# Patient Record
Sex: FEMALE | Race: WHITE | NOT HISPANIC OR LATINO | Employment: PART TIME | URBAN - NONMETROPOLITAN AREA
[De-identification: names, ages, dates, MRNs, and addresses within clinical notes are randomized per-mention and may not be internally consistent; named-entity substitution may affect disease eponyms.]

---

## 2017-02-20 ENCOUNTER — OFFICE VISIT (OUTPATIENT)
Dept: CARDIOLOGY | Facility: CLINIC | Age: 67
End: 2017-02-20

## 2017-02-20 VITALS
DIASTOLIC BLOOD PRESSURE: 70 MMHG | BODY MASS INDEX: 24.04 KG/M2 | HEART RATE: 52 BPM | WEIGHT: 149.6 LBS | SYSTOLIC BLOOD PRESSURE: 124 MMHG | HEIGHT: 66 IN | OXYGEN SATURATION: 98 %

## 2017-02-20 DIAGNOSIS — E78.5 HYPERLIPIDEMIA, UNSPECIFIED HYPERLIPIDEMIA TYPE: ICD-10-CM

## 2017-02-20 DIAGNOSIS — I47.1 ATRIAL TACHYCARDIA (HCC): Primary | ICD-10-CM

## 2017-02-20 PROCEDURE — 93000 ELECTROCARDIOGRAM COMPLETE: CPT | Performed by: INTERNAL MEDICINE

## 2017-02-20 PROCEDURE — 99213 OFFICE O/P EST LOW 20 MIN: CPT | Performed by: INTERNAL MEDICINE

## 2017-02-20 RX ORDER — DILTIAZEM HYDROCHLORIDE 120 MG/1
120 CAPSULE, COATED, EXTENDED RELEASE ORAL DAILY
Qty: 30 CAPSULE | Refills: 11 | Status: SHIPPED | OUTPATIENT
Start: 2017-02-20 | End: 2018-03-01 | Stop reason: SDUPTHER

## 2017-02-20 RX ORDER — FLECAINIDE ACETATE 100 MG/1
100 TABLET ORAL 2 TIMES DAILY
Qty: 60 TABLET | Refills: 11 | Status: SHIPPED | OUTPATIENT
Start: 2017-02-20 | End: 2018-04-01 | Stop reason: SDUPTHER

## 2017-02-20 NOTE — PROGRESS NOTES
Reason for Visit: cardiovascular follow up.    HPI:  Reshma Crouch is a 66 y.o. female is here today for follow-up.  She previously had an episode of atrial tachycardia in March 2016.  She was started on diltiazem and flecainide.  She has been followed by Dr. Moe as well who sees her once a year.  She has not had any further incidence of atrial tachycardia or significant palpitations since being on this medical therapy.  She denies any chest pain, dizziness, syncope, PND, orthopnea.    Previous Cardiac Testing and Procedures:  - Echo (03/30/2016) EF 60-65%, normal RV size and function, normal valves    Patient Active Problem List   Diagnosis   • ANTONIO (iron deficiency anemia)   • Paroxysmal SVT (supraventricular tachycardia)   • AV block   • Hyperlipidemia   • Atrial tachycardia       Social History   Substance Use Topics   • Smoking status: Never Smoker   • Smokeless tobacco: None   • Alcohol use No       Family History   Problem Relation Age of Onset   • Cirrhosis Mother      NON ALCOHOLIC CIRRHOSIS   • Other Father      NEPHRITIS/KIDNEY TRANSPLANT       The following portions of the patient's history were reviewed and updated as appropriate: allergies, current medications, past family history, past medical history, past social history, past surgical history and problem list.      Current Outpatient Prescriptions:   •  atorvastatin (LIPITOR) 20 MG tablet, Take 20 mg by mouth Every Night., Disp: , Rfl:   •  diltiaZEM CD (CARDIZEM CD) 120 MG 24 hr capsule, Take 120 mg by mouth Daily., Disp: , Rfl:   •  flecainide (TAMBOCOR) 100 MG tablet, Take 100 mg by mouth 2 (Two) Times a Day., Disp: , Rfl:     Review of Systems   Constitution: Negative for chills and fever.   Cardiovascular: Negative for chest pain and paroxysmal nocturnal dyspnea.   Respiratory: Negative for cough and shortness of breath.    Skin: Negative for rash.   Gastrointestinal: Negative for abdominal pain and heartburn.   Neurological: Negative for  "dizziness and numbness.       Objective   Visit Vitals   • /70 (BP Location: Left arm, Patient Position: Sitting, Cuff Size: Adult)   • Pulse 52   • Ht 66\" (167.6 cm)   • Wt 149 lb 9.6 oz (67.9 kg)   • SpO2 98%   • BMI 24.15 kg/m2     Physical Exam   Constitutional: She is oriented to person, place, and time. She appears well-developed and well-nourished.   HENT:   Head: Normocephalic and atraumatic.   Cardiovascular: Normal rate, regular rhythm and normal heart sounds.    No murmur heard.  Pulmonary/Chest: Effort normal and breath sounds normal.   Musculoskeletal: She exhibits no edema.   Neurological: She is alert and oriented to person, place, and time.   Skin: Skin is warm and dry.   Psychiatric: She has a normal mood and affect.       ECG 12 Lead  Date/Time: 2/20/2017 9:14 AM  Performed by: THAIS ROA  Authorized by: THAIS ROA   Comparison: compared with previous ECG from 8/15/2016  Similar to previous ECG  Rhythm: sinus rhythm  Rate: bradycardic  Clinical impression: normal ECG              ICD-10-CM ICD-9-CM   1. Atrial tachycardia I47.1 427.89   2. Hyperlipidemia, unspecified hyperlipidemia type E78.5 272.4         Assessment/Plan:  1.  Atrial tachycardia: Managed on diltiazem and flecainide.  No evidence of recurrence.  Follows with Dr. Moe once a year.    2.  Dyslipidemia: Managed on atorvastatin.  Plans to have her lipid panel rechecked by Dr. Coreas next month.    Follow-up 6 months, if remains free of any significant arrhythmias will go to one year follow-up thereafter.  "

## 2017-03-13 ENCOUNTER — TRANSCRIBE ORDERS (OUTPATIENT)
Dept: ADMINISTRATIVE | Facility: HOSPITAL | Age: 67
End: 2017-03-13

## 2017-03-13 DIAGNOSIS — Z12.31 ENCOUNTER FOR MAMMOGRAM TO ESTABLISH BASELINE MAMMOGRAM: Primary | ICD-10-CM

## 2017-03-20 ENCOUNTER — HOSPITAL ENCOUNTER (OUTPATIENT)
Dept: MAMMOGRAPHY | Facility: HOSPITAL | Age: 67
Discharge: HOME | End: 2017-03-20
Attending: INTERNAL MEDICINE | Admitting: INTERNAL MEDICINE

## 2017-03-20 DIAGNOSIS — Z12.31 ENCOUNTER FOR MAMMOGRAM TO ESTABLISH BASELINE MAMMOGRAM: ICD-10-CM

## 2017-03-20 PROCEDURE — 77063 BREAST TOMOSYNTHESIS BI: CPT

## 2017-03-20 PROCEDURE — G0202 SCR MAMMO BI INCL CAD: HCPCS

## 2017-05-01 ENCOUNTER — TRANSCRIBE ORDERS (OUTPATIENT)
Dept: ADMINISTRATIVE | Facility: HOSPITAL | Age: 67
End: 2017-05-01

## 2017-05-01 DIAGNOSIS — R16.1 SPLENOMEGALY: Primary | ICD-10-CM

## 2017-05-04 ENCOUNTER — TRANSCRIBE ORDERS (OUTPATIENT)
Dept: ADMINISTRATIVE | Facility: HOSPITAL | Age: 67
End: 2017-05-04

## 2017-05-04 DIAGNOSIS — R63.4 WEIGHT LOSS: ICD-10-CM

## 2017-05-04 DIAGNOSIS — R16.1 SPLENOMEGALY: Primary | ICD-10-CM

## 2017-05-08 ENCOUNTER — HOSPITAL ENCOUNTER (OUTPATIENT)
Dept: CT IMAGING | Facility: HOSPITAL | Age: 67
Discharge: HOME | End: 2017-05-08
Attending: INTERNAL MEDICINE | Admitting: INTERNAL MEDICINE

## 2017-05-08 ENCOUNTER — LAB (OUTPATIENT)
Dept: LAB | Facility: HOSPITAL | Age: 67
End: 2017-05-08
Attending: INTERNAL MEDICINE

## 2017-05-08 ENCOUNTER — TRANSCRIBE ORDERS (OUTPATIENT)
Dept: ADMINISTRATIVE | Facility: HOSPITAL | Age: 67
End: 2017-05-08

## 2017-05-08 ENCOUNTER — APPOINTMENT (OUTPATIENT)
Dept: ULTRASOUND IMAGING | Facility: HOSPITAL | Age: 67
End: 2017-05-08
Attending: INTERNAL MEDICINE

## 2017-05-08 DIAGNOSIS — R16.1 SPLENOMEGALY: ICD-10-CM

## 2017-05-08 DIAGNOSIS — R63.4 WEIGHT LOSS: ICD-10-CM

## 2017-05-08 DIAGNOSIS — N18.9 CKD (CHRONIC KIDNEY DISEASE), UNSPECIFIED STAGE: ICD-10-CM

## 2017-05-08 DIAGNOSIS — D64.9 ANEMIA, UNSPECIFIED TYPE: ICD-10-CM

## 2017-05-08 DIAGNOSIS — N18.9 CKD (CHRONIC KIDNEY DISEASE), UNSPECIFIED STAGE: Primary | ICD-10-CM

## 2017-05-08 LAB — CREAT BLDA-MCNC: 1.1 MG/DL (ref 0.6–1.3)

## 2017-05-08 PROCEDURE — 81050 URINALYSIS VOLUME MEASURE: CPT | Performed by: INTERNAL MEDICINE

## 2017-05-08 PROCEDURE — 74177 CT ABD & PELVIS W/CONTRAST: CPT

## 2017-05-08 PROCEDURE — 84156 ASSAY OF PROTEIN URINE: CPT | Performed by: INTERNAL MEDICINE

## 2017-05-08 PROCEDURE — 86335 IMMUNFIX E-PHORSIS/URINE/CSF: CPT | Performed by: INTERNAL MEDICINE

## 2017-05-08 PROCEDURE — 0 IOPAMIDOL 61 % SOLUTION: Performed by: INTERNAL MEDICINE

## 2017-05-08 PROCEDURE — 84166 PROTEIN E-PHORESIS/URINE/CSF: CPT | Performed by: INTERNAL MEDICINE

## 2017-05-08 PROCEDURE — 82565 ASSAY OF CREATININE: CPT

## 2017-05-08 RX ADMIN — IOPAMIDOL 100 ML: 612 INJECTION, SOLUTION INTRAVENOUS at 09:45

## 2017-05-09 LAB
ALBUMIN 24H MFR UR ELPH: 34 %
ALPHA1 GLOB 24H MFR UR ELPH: 2.9 %
ALPHA2 GLOB 24H MFR UR ELPH: 13.3 %
B-GLOBULIN MFR UR ELPH: 28.2 %
GAMMA GLOB 24H MFR UR ELPH: 21.6 %
HIV 1 & 2 AB SER-IMP: ABNORMAL
INTERPRETATION UR IFE-IMP: ABNORMAL
M PROTEIN 24H MFR UR ELPH: ABNORMAL %
PROT 24H UR-MRATE: 275 MG/24 HR (ref 30–150)
PROT UR-MCNC: 16.2 MG/DL

## 2017-06-05 ENCOUNTER — OFFICE VISIT (OUTPATIENT)
Dept: UROLOGY | Facility: CLINIC | Age: 67
End: 2017-06-05

## 2017-06-05 VITALS
SYSTOLIC BLOOD PRESSURE: 122 MMHG | BODY MASS INDEX: 24.06 KG/M2 | DIASTOLIC BLOOD PRESSURE: 74 MMHG | HEIGHT: 65 IN | TEMPERATURE: 96.5 F | WEIGHT: 144.4 LBS

## 2017-06-05 DIAGNOSIS — N28.89 RENAL MASS: Primary | ICD-10-CM

## 2017-06-05 LAB
BILIRUB BLD-MCNC: NEGATIVE MG/DL
CLARITY, POC: CLEAR
COLOR UR: YELLOW
GLUCOSE UR STRIP-MCNC: NEGATIVE MG/DL
KETONES UR QL: NEGATIVE
LEUKOCYTE EST, POC: NEGATIVE
NITRITE UR-MCNC: NEGATIVE MG/ML
PH UR: 5.5 [PH] (ref 5–8)
PROT UR STRIP-MCNC: NEGATIVE MG/DL
RBC # UR STRIP: ABNORMAL /UL
SP GR UR: 1.02 (ref 1–1.03)
UROBILINOGEN UR QL: NORMAL

## 2017-06-05 PROCEDURE — 81003 URINALYSIS AUTO W/O SCOPE: CPT | Performed by: UROLOGY

## 2017-06-05 PROCEDURE — 99204 OFFICE O/P NEW MOD 45 MIN: CPT | Performed by: UROLOGY

## 2017-06-05 NOTE — PROGRESS NOTES
Subjective    Ms. Crouch is 67 y.o. female    Chief Complaint: Renal Mass    History of Present Illness     Renal Mass  Patient here for evaluation of renal mass.  The renal mass was found last week.  The location of the mass is the left kidney.  The mass has been present for1 week.  The mass is described as solid.  The size of the mass is 13cm.  It would be classifed as a Bosniak IV.  The mass was found on evaluation of splenomegaly.  Associated symptoms include microhematuria.      The following portions of the patient's history were reviewed and updated as appropriate: allergies, current medications, past family history, past medical history, past social history, past surgical history and problem list.    Review of Systems   Constitutional: Negative for appetite change, diaphoresis and fever.   HENT: Negative for facial swelling and sore throat.    Eyes: Negative for discharge and visual disturbance.   Respiratory: Negative for cough and shortness of breath.    Cardiovascular: Negative for chest pain and leg swelling.   Gastrointestinal: Negative for anal bleeding and vomiting.   Endocrine: Negative for cold intolerance and heat intolerance.   Genitourinary: Negative for flank pain, hematuria and pelvic pain.   Musculoskeletal: Negative for back pain and gait problem.   Skin: Negative for pallor and rash.   Allergic/Immunologic: Negative for food allergies and immunocompromised state.   Neurological: Negative for seizures and headaches.   Hematological: Negative for adenopathy. Does not bruise/bleed easily.   Psychiatric/Behavioral: Negative for dysphoric mood, self-injury and suicidal ideas.         Current Outpatient Prescriptions:   •  atorvastatin (LIPITOR) 20 MG tablet, Take 20 mg by mouth Every Night., Disp: , Rfl:   •  diltiaZEM CD (CARDIZEM CD) 120 MG 24 hr capsule, Take 1 capsule by mouth Daily., Disp: 30 capsule, Rfl: 11  •  flecainide (TAMBOCOR) 100 MG tablet, Take 1 tablet by mouth 2 (Two) Times a Day.,  "Disp: 60 tablet, Rfl: 11    Past Medical History:   Diagnosis Date   • Atrial tachycardia    • AV block    • Hospital discharge follow-up    • Hyperlipidemia    • ANTONIO (iron deficiency anemia)    • Paroxysmal SVT (supraventricular tachycardia)        Past Surgical History:   Procedure Laterality Date   •  SECTION     • CHOLECYSTECTOMY     • TONSILLECTOMY         Social History     Social History   • Marital status: Single     Spouse name: N/A   • Number of children: N/A   • Years of education: N/A     Social History Main Topics   • Smoking status: Never Smoker   • Smokeless tobacco: None   • Alcohol use No   • Drug use: Defer   • Sexual activity: Defer     Other Topics Concern   • None     Social History Narrative       Family History   Problem Relation Age of Onset   • Cirrhosis Mother      NON ALCOHOLIC CIRRHOSIS   • Other Father      NEPHRITIS/KIDNEY TRANSPLANT   • Breast cancer Neg Hx        Objective    /74  Temp 96.5 °F (35.8 °C)  Ht 65\" (165.1 cm)  Wt 144 lb 6.4 oz (65.5 kg)  BMI 24.03 kg/m2    Physical Exam   Constitutional: She is oriented to person, place, and time. She appears well-developed and well-nourished. No distress.   HENT:   Head: Normocephalic and atraumatic.   Right Ear: External ear and ear canal normal.   Left Ear: External ear and ear canal normal.   Nose: No nasal deformity. No epistaxis.   Mouth/Throat: Oropharynx is clear and moist. Mucous membranes are not pale, not dry and not cyanotic. Normal dentition. No oropharyngeal exudate.   Neck: Trachea normal. No tracheal tenderness present. No tracheal deviation present. No thyroid mass and no thyromegaly present.   Pulmonary/Chest: Effort normal. No accessory muscle usage. No respiratory distress. Chest wall is not dull to percussion (No flatness or hyperresonance). She exhibits no mass and no tenderness.   On palpation, no tactile fremitus. All movements are symmetric. No intercostal retraction noted.    Abdominal: Soft. " Normal appearance. She exhibits no distension and no mass. There is no hepatosplenomegaly. There is no tenderness. No hernia.   Rectal examination or stool specimen is not indicated.    Musculoskeletal:   Normal gait and station. The spine, ribs, and pelvis are examined. No obvious misalignment or asymmetry. ROM is reasonable for age. No instability. No obvious atrophy, flaccidity or spasticity.    Lymphadenopathy:     She has no cervical adenopathy.        Right: No inguinal adenopathy present.        Left: No inguinal adenopathy present.   Neurological: She is alert and oriented to person, place, and time.   Skin: Skin is warm, dry and intact. No lesion and no rash noted. She is not diaphoretic. No cyanosis. No pallor. Nails show no clubbing.   On palpation, there were no induration, subcutaneous nodules, or tightening   Psychiatric: Her speech is normal and behavior is normal. Judgment and thought content normal. Her mood appears not anxious. Her affect is not labile. She does not exhibit a depressed mood.   Vitals reviewed.          Results for orders placed or performed in visit on 06/05/17   POC Urinalysis Dipstick, Automated   Result Value Ref Range    Color Yellow Yellow, Straw, Dark Yellow, Tsering    Clarity, UA Clear Clear    Glucose, UA Negative Negative, 1000 mg/dL (3+) mg/dL    Bilirubin Negative Negative    Ketones, UA Negative Negative    Specific Gravity  1.020 1.005 - 1.030    Blood, UA Large (A) Negative    pH, Urine 5.5 5.0 - 8.0    Protein, POC Negative Negative mg/dL    Urobilinogen, UA Normal Normal    Leukocytes Negative Negative    Nitrite, UA Negative Negative   CT independent review  The CT scan of the abdomen/pelvis done without contrast is available for me to review.  Treatment recommendations require an independent review.  First I scanned the liver, spleen, and bowel pattern.  The retroperitoneum including the major vessels and lymphatic packages are briefly reviewed.  This film as been  reviewed by the radiologist to determine any non urologic abnormalities that are present.  The kidneys are closely inspected for size, symmetry, contour, parenchymal thickness, perinephric reaction, presence of calcifications, and intrarenal dilation of the collecting system.  The ureters are inspected for their course, caliber, and any calcifications.  The bladder is inspected for its thickness, size, and presence of any calcifications.  This scan shows:    The right kidney appears simple renal cyst    The left kidney appears 13 cm upper pole renal mass almost completely replacing the left kidney.  There is a plane between the spleen and the mass as well as the pancreas and the mass.  There is some hilar adenopathy and some parasitic vessels.    The bladder appears normal on this non-contrasted CT scan.  The bladder appears normal in thickness.  There no masses or stones seen on this exam.      Assessment and Plan    Reshma was seen today for renal mass.    Diagnoses and all orders for this visit:    Renal mass  -     POC Urinalysis Dipstick, Automated      She has a very large renal mass.  This is obviously concerning for renal cell carcinoma.  She has a CMP shows a normal alkaline phosphatase.  She is not having any bone or neurologic symptoms.  I am going to check a chest x-ray today.  I have recommended referral to [facility] for this large mass.  I think she would best be served by being seen at a tertiary care center.  She also has a mass or anorectal junction which she seen the gastroenterologist for on the 19th of this month.

## 2017-06-19 ENCOUNTER — OFFICE VISIT (OUTPATIENT)
Dept: GASTROENTEROLOGY | Facility: CLINIC | Age: 67
End: 2017-06-19

## 2017-06-19 VITALS
HEART RATE: 64 BPM | WEIGHT: 141 LBS | HEIGHT: 65 IN | DIASTOLIC BLOOD PRESSURE: 76 MMHG | TEMPERATURE: 97.9 F | BODY MASS INDEX: 23.49 KG/M2 | SYSTOLIC BLOOD PRESSURE: 124 MMHG

## 2017-06-19 DIAGNOSIS — D50.9 IRON DEFICIENCY ANEMIA, UNSPECIFIED IRON DEFICIENCY ANEMIA TYPE: ICD-10-CM

## 2017-06-19 DIAGNOSIS — R93.5 ABNORMAL CT OF THE ABDOMEN: Primary | ICD-10-CM

## 2017-06-19 PROCEDURE — 99204 OFFICE O/P NEW MOD 45 MIN: CPT | Performed by: NURSE PRACTITIONER

## 2017-06-19 RX ORDER — FERROUS SULFATE 325(65) MG
TABLET ORAL
COMMUNITY
Start: 2017-06-12 | End: 2018-04-23

## 2017-06-19 NOTE — PROGRESS NOTES
Chief Complaint   Patient presents with   • Anemia     is anemic had ct showed rectal mass       Subjective     HPI  Pt referred to Dr Samuels for further evaluation of anemia.  CT of abdomen on 17 for evaluation of splenomegaly revealed mass to left kidney and a questionable mass at the anal verge.  Small lymph nodes in the left renal hilum could represent metastatic disease.  She has been evaluated by Dr Pack and currently has apt at [facility] on July 3 for further evaluation of kidney mass.  She denies weight loss, change in bowels, no BRBPR.  Stools were not dark until she started taking an iron supplement to treat iron deficiency.  She has never had colonoscopy evaluation.    Lab Results   Component Value Date    IRON 33 (L) 2016         Past Medical History:   Diagnosis Date   • Atrial tachycardia    • AV block    • Hospital discharge follow-up    • Hyperlipidemia    • ANTONIO (iron deficiency anemia)    • Paroxysmal SVT (supraventricular tachycardia)        Past Surgical History:   Procedure Laterality Date   •  SECTION     • CHOLECYSTECTOMY     • TONSILLECTOMY         Outpatient Prescriptions Marked as Taking for the 17 encounter (Office Visit) with JUSTIN Emmanuel   Medication Sig Dispense Refill   • atorvastatin (LIPITOR) 20 MG tablet Take 20 mg by mouth Every Night.     • diltiaZEM CD (CARDIZEM CD) 120 MG 24 hr capsule Take 1 capsule by mouth Daily. 30 capsule 11   • ferrous sulfate 325 (65 FE) MG tablet      • flecainide (TAMBOCOR) 100 MG tablet Take 1 tablet by mouth 2 (Two) Times a Day. 60 tablet 11   • folic acid-vit B6-vit B12 (FOLBEE) 2.5-25-1 MG tablet tablet Take  by mouth Daily.         Allergies   Allergen Reactions   • Novocain [Procaine]      LOCAL ANESTHETICS-Parenteral       Social History     Social History   • Marital status: Single     Spouse name: N/A   • Number of children: N/A   • Years of education: N/A     Occupational History   • Not on file.     Social  "History Main Topics   • Smoking status: Never Smoker   • Smokeless tobacco: Never Used   • Alcohol use No   • Drug use: No   • Sexual activity: Defer     Other Topics Concern   • Not on file     Social History Narrative       Family History   Problem Relation Age of Onset   • Cirrhosis Mother      NON ALCOHOLIC CIRRHOSIS   • Other Father      NEPHRITIS/KIDNEY TRANSPLANT   • Breast cancer Neg Hx    • Colon cancer Neg Hx    • Esophageal cancer Neg Hx        Review of Systems   Constitutional: Negative for fatigue, fever and unexpected weight change.   HENT: Negative for hearing loss, sore throat and voice change.    Eyes: Negative for visual disturbance.   Respiratory: Negative for cough, shortness of breath and wheezing.    Cardiovascular: Negative for chest pain and palpitations.   Gastrointestinal: Negative for abdominal pain, blood in stool and vomiting.   Endocrine: Negative for polydipsia and polyuria.   Genitourinary: Negative for difficulty urinating, dysuria, hematuria and urgency.   Musculoskeletal: Negative for joint swelling and myalgias.   Skin: Negative for color change, rash and wound.   Neurological: Negative for dizziness, tremors, seizures and syncope.   Hematological: Does not bruise/bleed easily.   Psychiatric/Behavioral: Negative for agitation and confusion. The patient is not nervous/anxious.        Objective     Vitals:    06/19/17 0902   BP: 124/76   Pulse: 64   Temp: 97.9 °F (36.6 °C)   Weight: 141 lb (64 kg)   Height: 65\" (165.1 cm)     Body mass index is 23.46 kg/(m^2).    Physical Exam   Constitutional: She is oriented to person, place, and time. She appears well-developed and well-nourished.   HENT:   Head: Normocephalic and atraumatic.   Eyes: Conjunctivae are normal. Pupils are equal, round, and reactive to light. No scleral icterus.   Neck: No JVD present. No thyroid mass and no thyromegaly present.   Cardiovascular: Normal rate, regular rhythm and normal heart sounds.  Exam reveals no " gallop and no friction rub.    No murmur heard.  Pulmonary/Chest: Effort normal and breath sounds normal. No accessory muscle usage. No respiratory distress. She has no wheezes. She has no rales.   Abdominal: Soft. Bowel sounds are normal. She exhibits no distension, no ascites and no mass. There is no splenomegaly or hepatomegaly. There is no tenderness. There is no rebound and no guarding.   Genitourinary:   Genitourinary Comments: Rectal-Did not examine   Musculoskeletal: Normal range of motion. She exhibits no edema.   Neurological: She is alert and oriented to person, place, and time.   Deemed a reliable historian, able to converse without difficulty and able to move all extremities without difficulty   Skin: Skin is warm and dry.   Psychiatric: She has a normal mood and affect. Her behavior is normal.       Imaging Results (most recent)     None          Assessment/Plan     Reshma was seen today for anemia.    Diagnoses and all orders for this visit:    Abnormal CT of the abdomen  -     polyethylene glycol (GoLYTELY) 236 G solution; Take 3,785 mL by mouth 1 (One) Time for 1 dose. Take as directed  -     Case Request; Standing  -     Implement Anesthesia Orders Day of Procedure; Standing  -     Obtain Informed Consent; Standing  -     Case Request    Iron deficiency anemia, unspecified iron deficiency anemia type      COLONOSCOPY WITH ANESTHESIA (N/A)    There are no Patient Instructions on file for this visit.    Strongly encouraged pt to proceed with CScope this week so pathology reports would be available for apt at [place] in the event an abnormality was observed during procedure    All risks, benefits, alternatives, and indications of colonoscopy procedure have been discussed with the patient. Risks to include perforation of the colon requiring possible surgery or colostomy, risk of bleeding from biopsies or removal of colon tissue, possibility of missing a colon polyp or cancer, or adverse drug  reaction.  Benefits to include the diagnosis and management of disease of the colon and rectum. Alternatives to include barium enema, radiographic evaluation, lab testing or no intervention. Pt verbalizes understanding and agrees to proceed with procedure.

## 2017-06-21 ENCOUNTER — ANESTHESIA EVENT (OUTPATIENT)
Dept: GASTROENTEROLOGY | Facility: HOSPITAL | Age: 67
End: 2017-06-21

## 2017-06-22 ENCOUNTER — HOSPITAL ENCOUNTER (OUTPATIENT)
Facility: HOSPITAL | Age: 67
Setting detail: HOSPITAL OUTPATIENT SURGERY
Discharge: HOME | End: 2017-06-22
Attending: INTERNAL MEDICINE | Admitting: INTERNAL MEDICINE

## 2017-06-22 ENCOUNTER — TELEPHONE (OUTPATIENT)
Dept: GASTROENTEROLOGY | Facility: CLINIC | Age: 67
End: 2017-06-22

## 2017-06-22 ENCOUNTER — ANESTHESIA (OUTPATIENT)
Dept: GASTROENTEROLOGY | Facility: HOSPITAL | Age: 67
End: 2017-06-22

## 2017-06-22 VITALS
BODY MASS INDEX: 22.5 KG/M2 | WEIGHT: 140 LBS | HEIGHT: 66 IN | OXYGEN SATURATION: 98 % | HEART RATE: 55 BPM | TEMPERATURE: 97.7 F | RESPIRATION RATE: 17 BRPM | DIASTOLIC BLOOD PRESSURE: 67 MMHG | SYSTOLIC BLOOD PRESSURE: 121 MMHG

## 2017-06-22 DIAGNOSIS — R93.5 ABNORMAL CT OF THE ABDOMEN: ICD-10-CM

## 2017-06-22 PROCEDURE — 45385 COLONOSCOPY W/LESION REMOVAL: CPT | Performed by: INTERNAL MEDICINE

## 2017-06-22 PROCEDURE — 25010000002 PROPOFOL 10 MG/ML EMULSION: Performed by: NURSE ANESTHETIST, CERTIFIED REGISTERED

## 2017-06-22 RX ORDER — PROPOFOL 10 MG/ML
VIAL (ML) INTRAVENOUS AS NEEDED
Status: DISCONTINUED | OUTPATIENT
Start: 2017-06-22 | End: 2017-06-22 | Stop reason: SURG

## 2017-06-22 RX ORDER — LIDOCAINE HYDROCHLORIDE 20 MG/ML
INJECTION, SOLUTION INFILTRATION; PERINEURAL AS NEEDED
Status: DISCONTINUED | OUTPATIENT
Start: 2017-06-22 | End: 2017-06-22 | Stop reason: SURG

## 2017-06-22 RX ORDER — SODIUM CHLORIDE 0.9 % (FLUSH) 0.9 %
1-10 SYRINGE (ML) INJECTION AS NEEDED
Status: DISCONTINUED | OUTPATIENT
Start: 2017-06-22 | End: 2017-06-22 | Stop reason: HOSPADM

## 2017-06-22 RX ORDER — SODIUM CHLORIDE 9 MG/ML
100 INJECTION, SOLUTION INTRAVENOUS CONTINUOUS
Status: DISCONTINUED | OUTPATIENT
Start: 2017-06-22 | End: 2017-06-22 | Stop reason: HOSPADM

## 2017-06-22 RX ADMIN — LIDOCAINE HYDROCHLORIDE 50 MG: 20 INJECTION, SOLUTION INFILTRATION; PERINEURAL at 11:47

## 2017-06-22 RX ADMIN — PROPOFOL 200 MG: 10 INJECTION, EMULSION INTRAVENOUS at 11:46

## 2017-06-22 RX ADMIN — SODIUM CHLORIDE 100 ML/HR: 9 INJECTION, SOLUTION INTRAVENOUS at 11:38

## 2017-06-22 NOTE — PLAN OF CARE
Problem: Patient Care Overview (Adult)  Goal: Plan of Care Review    06/22/17 1158   Outcome Evaluation   Outcome Summary/Follow up Plan tolerated procedure well   Patient Care Overview   Progress improving         Problem: GI Endoscopy (Adult)  Goal: Signs and Symptoms of Listed Potential Problems Will be Absent or Manageable (GI Endoscopy)  Outcome: Ongoing (interventions implemented as appropriate)    06/22/17 1158   GI Endoscopy   Problems Assessed (GI Endoscopy) all   Problems Present (GI Endoscopy) none

## 2017-06-22 NOTE — PLAN OF CARE
Problem: Patient Care Overview (Adult)  Goal: Adult Individualization and Mutuality  Outcome: Ongoing (interventions implemented as appropriate)    06/22/17 1124   Individualization   Patient Specific Preferences none

## 2017-06-22 NOTE — H&P (VIEW-ONLY)
Chief Complaint   Patient presents with   • Anemia     is anemic had ct showed rectal mass       Subjective     HPI  Pt referred to Dr Samuels for further evaluation of anemia.  CT of abdomen on 17 for evaluation of splenomegaly revealed mass to left kidney and a questionable mass at the anal verge.  Small lymph nodes in the left renal hilum could represent metastatic disease.  She has been evaluated by Dr Pack and currently has apt at [place] on July 3 for further evaluation of kidney mass.  She denies weight loss, change in bowels, no BRBPR.  Stools were not dark until she started taking an iron supplement to treat iron deficiency.  She has never had colonoscopy evaluation.    Lab Results   Component Value Date    IRON 33 (L) 2016         Past Medical History:   Diagnosis Date   • Atrial tachycardia    • AV block    • Hospital discharge follow-up    • Hyperlipidemia    • ANTONIO (iron deficiency anemia)    • Paroxysmal SVT (supraventricular tachycardia)        Past Surgical History:   Procedure Laterality Date   •  SECTION     • CHOLECYSTECTOMY     • TONSILLECTOMY         Outpatient Prescriptions Marked as Taking for the 17 encounter (Office Visit) with JUSTIN Emmanuel   Medication Sig Dispense Refill   • atorvastatin (LIPITOR) 20 MG tablet Take 20 mg by mouth Every Night.     • diltiaZEM CD (CARDIZEM CD) 120 MG 24 hr capsule Take 1 capsule by mouth Daily. 30 capsule 11   • ferrous sulfate 325 (65 FE) MG tablet      • flecainide (TAMBOCOR) 100 MG tablet Take 1 tablet by mouth 2 (Two) Times a Day. 60 tablet 11   • folic acid-vit B6-vit B12 (FOLBEE) 2.5-25-1 MG tablet tablet Take  by mouth Daily.         Allergies   Allergen Reactions   • Novocain [Procaine]      LOCAL ANESTHETICS-Parenteral       Social History     Social History   • Marital status: Single     Spouse name: N/A   • Number of children: N/A   • Years of education: N/A     Occupational History   • Not on file.     Social  "History Main Topics   • Smoking status: Never Smoker   • Smokeless tobacco: Never Used   • Alcohol use No   • Drug use: No   • Sexual activity: Defer     Other Topics Concern   • Not on file     Social History Narrative       Family History   Problem Relation Age of Onset   • Cirrhosis Mother      NON ALCOHOLIC CIRRHOSIS   • Other Father      NEPHRITIS/KIDNEY TRANSPLANT   • Breast cancer Neg Hx    • Colon cancer Neg Hx    • Esophageal cancer Neg Hx        Review of Systems   Constitutional: Negative for fatigue, fever and unexpected weight change.   HENT: Negative for hearing loss, sore throat and voice change.    Eyes: Negative for visual disturbance.   Respiratory: Negative for cough, shortness of breath and wheezing.    Cardiovascular: Negative for chest pain and palpitations.   Gastrointestinal: Negative for abdominal pain, blood in stool and vomiting.   Endocrine: Negative for polydipsia and polyuria.   Genitourinary: Negative for difficulty urinating, dysuria, hematuria and urgency.   Musculoskeletal: Negative for joint swelling and myalgias.   Skin: Negative for color change, rash and wound.   Neurological: Negative for dizziness, tremors, seizures and syncope.   Hematological: Does not bruise/bleed easily.   Psychiatric/Behavioral: Negative for agitation and confusion. The patient is not nervous/anxious.        Objective     Vitals:    06/19/17 0902   BP: 124/76   Pulse: 64   Temp: 97.9 °F (36.6 °C)   Weight: 141 lb (64 kg)   Height: 65\" (165.1 cm)     Body mass index is 23.46 kg/(m^2).    Physical Exam   Constitutional: She is oriented to person, place, and time. She appears well-developed and well-nourished.   HENT:   Head: Normocephalic and atraumatic.   Eyes: Conjunctivae are normal. Pupils are equal, round, and reactive to light. No scleral icterus.   Neck: No JVD present. No thyroid mass and no thyromegaly present.   Cardiovascular: Normal rate, regular rhythm and normal heart sounds.  Exam reveals no " gallop and no friction rub.    No murmur heard.  Pulmonary/Chest: Effort normal and breath sounds normal. No accessory muscle usage. No respiratory distress. She has no wheezes. She has no rales.   Abdominal: Soft. Bowel sounds are normal. She exhibits no distension, no ascites and no mass. There is no splenomegaly or hepatomegaly. There is no tenderness. There is no rebound and no guarding.   Genitourinary:   Genitourinary Comments: Rectal-Did not examine   Musculoskeletal: Normal range of motion. She exhibits no edema.   Neurological: She is alert and oriented to person, place, and time.   Deemed a reliable historian, able to converse without difficulty and able to move all extremities without difficulty   Skin: Skin is warm and dry.   Psychiatric: She has a normal mood and affect. Her behavior is normal.       Imaging Results (most recent)     None          Assessment/Plan     Reshma was seen today for anemia.    Diagnoses and all orders for this visit:    Abnormal CT of the abdomen  -     polyethylene glycol (GoLYTELY) 236 G solution; Take 3,785 mL by mouth 1 (One) Time for 1 dose. Take as directed  -     Case Request; Standing  -     Implement Anesthesia Orders Day of Procedure; Standing  -     Obtain Informed Consent; Standing  -     Case Request    Iron deficiency anemia, unspecified iron deficiency anemia type      COLONOSCOPY WITH ANESTHESIA (N/A)    There are no Patient Instructions on file for this visit.    Strongly encouraged pt to proceed with CScope this week so pathology reports would be available for apt at [facility] in the event an abnormality was observed during procedure    All risks, benefits, alternatives, and indications of colonoscopy procedure have been discussed with the patient. Risks to include perforation of the colon requiring possible surgery or colostomy, risk of bleeding from biopsies or removal of colon tissue, possibility of missing a colon polyp or cancer, or adverse drug  reaction.  Benefits to include the diagnosis and management of disease of the colon and rectum. Alternatives to include barium enema, radiographic evaluation, lab testing or no intervention. Pt verbalizes understanding and agrees to proceed with procedure.

## 2017-06-22 NOTE — ANESTHESIA POSTPROCEDURE EVALUATION
"Patient: Reshma Crouch    Procedure Summary     Date Anesthesia Start Anesthesia Stop Room / Location    06/22/17 1144 1159  [facility] /  [facility]       Procedure Diagnosis Surgeon Provider    COLONOSCOPY WITH ANESTHESIA (N/A ) Abnormal CT of the abdomen  (Abnormal CT of the abdomen [R93.5]) DO Ede Omer, LEATHA          Anesthesia Type: general  Last vitals  /70 (06/22/17 1125)    Temp 97.7 °F (36.5 °C) (06/22/17 1125)    Pulse 98 (06/22/17 1125)   Resp 20 (06/22/17 1125)    SpO2 100 % (06/22/17 1125)      Post Anesthesia Care and Evaluation    Patient location during evaluation: PHASE II  Patient participation: complete - patient participated  Level of consciousness: awake  Pain management: adequate  Airway patency: patent  Anesthetic complications: No anesthetic complications  Respiratory status: acceptable  Hydration status: acceptable    Comments: Blood pressure 122/70, pulse 98, temperature 97.7 °F (36.5 °C), temperature source Temporal Artery , resp. rate 20, height 66\" (167.6 cm), weight 140 lb (63.5 kg), SpO2 100 %.        "

## 2017-06-22 NOTE — PLAN OF CARE
Problem: Patient Care Overview (Adult)  Goal: Plan of Care Review  Outcome: Outcome(s) achieved Date Met:  06/22/17 06/22/17 1214   Outcome Evaluation   Outcome Summary/Follow up Plan meets discharge criteria   Patient Care Overview   Progress no change   Coping/Psychosocial Response Interventions   Plan Of Care Reviewed With patient;family         Problem: GI Endoscopy (Adult)  Goal: Signs and Symptoms of Listed Potential Problems Will be Absent or Manageable (GI Endoscopy)  Outcome: Outcome(s) achieved Date Met:  06/22/17 06/22/17 1214   GI Endoscopy   Problems Assessed (GI Endoscopy) all   Problems Present (GI Endoscopy) none

## 2017-06-22 NOTE — ANESTHESIA PREPROCEDURE EVALUATION
Anesthesia Evaluation     Patient summary reviewed   no history of anesthetic complications:  NPO Solid Status: > 8 hours  NPO Liquid Status: > 8 hours     Airway   Mallampati: II  TM distance: >3 FB  Neck ROM: full  no difficulty expected  Dental - normal exam     Pulmonary - negative pulmonary ROS and normal exam    breath sounds clear to auscultation  Cardiovascular - normal exam  Exercise tolerance: good (4-7 METS)    Rhythm: regular  Rate: normal    (+) dysrhythmias, hyperlipidemia      Neuro/Psych- negative ROS  GI/Hepatic/Renal/Endo      ROS Comment: Anemia    Musculoskeletal (-) negative ROS    Abdominal  - normal exam   Substance History - negative use     OB/GYN          Other - negative ROS                                       Anesthesia Plan    ASA 3     general     intravenous induction   Anesthetic plan and risks discussed with patient.    Plan discussed with CRNA.

## 2017-09-18 ENCOUNTER — OFFICE VISIT (OUTPATIENT)
Dept: CARDIOLOGY | Facility: CLINIC | Age: 67
End: 2017-09-18

## 2017-09-18 VITALS
BODY MASS INDEX: 22.5 KG/M2 | HEART RATE: 55 BPM | WEIGHT: 140 LBS | OXYGEN SATURATION: 99 % | HEIGHT: 66 IN | SYSTOLIC BLOOD PRESSURE: 116 MMHG | DIASTOLIC BLOOD PRESSURE: 70 MMHG

## 2017-09-18 DIAGNOSIS — I47.1 ATRIAL TACHYCARDIA (HCC): Primary | ICD-10-CM

## 2017-09-18 DIAGNOSIS — E78.5 HYPERLIPIDEMIA, UNSPECIFIED HYPERLIPIDEMIA TYPE: ICD-10-CM

## 2017-09-18 DIAGNOSIS — R00.1 SINUS BRADYCARDIA: ICD-10-CM

## 2017-09-18 PROBLEM — Z90.5 HISTORY OF NEPHRECTOMY: Status: ACTIVE | Noted: 2017-09-18

## 2017-09-18 PROCEDURE — 93000 ELECTROCARDIOGRAM COMPLETE: CPT | Performed by: INTERNAL MEDICINE

## 2017-09-18 PROCEDURE — 99213 OFFICE O/P EST LOW 20 MIN: CPT | Performed by: INTERNAL MEDICINE

## 2017-09-18 NOTE — PROGRESS NOTES
Reason for Visit: cardiovascular follow up.    HPI:  Reshma Crouch is a 67 y.o. female is here today for follow-up.  She recently had a left nephrectomy due to a mass.  She has done well from a cardiac standpoint and has no issues.  Denies any palpitations or tachycardia episodes.      Previous Cardiac Testing and Procedures:  - Echo (03/30/2016) EF 60-65%, normal RV size and function, normal valves    Patient Active Problem List   Diagnosis   • ANTONIO (iron deficiency anemia)   • Paroxysmal SVT (supraventricular tachycardia)   • AV block   • Hyperlipidemia   • Atrial tachycardia   • History of nephrectomy       Social History   Substance Use Topics   • Smoking status: Never Smoker   • Smokeless tobacco: Never Used   • Alcohol use No       Family History   Problem Relation Age of Onset   • Cirrhosis Mother      NON ALCOHOLIC CIRRHOSIS   • Other Father      NEPHRITIS/KIDNEY TRANSPLANT   • Breast cancer Neg Hx    • Colon cancer Neg Hx    • Esophageal cancer Neg Hx        The following portions of the patient's history were reviewed and updated as appropriate: allergies, current medications, past family history, past medical history, past social history, past surgical history and problem list.      Current Outpatient Prescriptions:   •  atorvastatin (LIPITOR) 20 MG tablet, Take 20 mg by mouth Every Night., Disp: , Rfl:   •  diltiaZEM CD (CARDIZEM CD) 120 MG 24 hr capsule, Take 1 capsule by mouth Daily., Disp: 30 capsule, Rfl: 11  •  ferrous sulfate 325 (65 FE) MG tablet, , Disp: , Rfl:   •  flecainide (TAMBOCOR) 100 MG tablet, Take 1 tablet by mouth 2 (Two) Times a Day., Disp: 60 tablet, Rfl: 11    Review of Systems   Constitution: Negative for chills and fever.   Cardiovascular: Negative for chest pain and paroxysmal nocturnal dyspnea.   Respiratory: Negative for cough and shortness of breath.    Skin: Negative for rash.   Gastrointestinal: Negative for abdominal pain and heartburn.   Neurological: Negative for  "dizziness and numbness.       Objective   /70 (BP Location: Right arm, Patient Position: Sitting, Cuff Size: Adult)  Pulse 55  Ht 66\" (167.6 cm)  Wt 140 lb (63.5 kg)  SpO2 99%  BMI 22.6 kg/m2  Physical Exam   Constitutional: She is oriented to person, place, and time. She appears well-developed and well-nourished.   HENT:   Head: Normocephalic and atraumatic.   Cardiovascular: Regular rhythm and normal heart sounds.  Bradycardia present.    No murmur heard.  Pulmonary/Chest: Effort normal and breath sounds normal.   Musculoskeletal: She exhibits no edema.   Neurological: She is alert and oriented to person, place, and time.   Skin: Skin is warm and dry.   Psychiatric: She has a normal mood and affect.       ECG 12 Lead  Date/Time: 9/18/2017 9:53 AM  Performed by: THAIS ROA  Authorized by: THAIS ROA   Comparison: compared with previous ECG from 2/20/2017  Similar to previous ECG  Rhythm: sinus bradycardia  Other findings comments: Poor R wave progression  Clinical impression: low voltage              ICD-10-CM ICD-9-CM   1. Atrial tachycardia I47.1 427.89   2. Hyperlipidemia, unspecified hyperlipidemia type E78.5 272.4   3. Sinus bradycardia R00.1 427.89         Assessment/Plan:  1. Atrial tachycardia: No further episodes.  Followed serially with Dr. Moe of [facility].  Continue therapy with diltiazem and flecainide.    2.  Dyslipidemia: Continue atorvastatin.     3. Sinus bradycardia: Likely secondary to diltiazem.  Appears asymptomatic.  Continue to monitor and stop diltiazem if symptoms develop.      "

## 2018-03-01 DIAGNOSIS — I47.1 ATRIAL TACHYCARDIA (HCC): ICD-10-CM

## 2018-03-01 RX ORDER — DILTIAZEM HYDROCHLORIDE 120 MG/1
CAPSULE, EXTENDED RELEASE ORAL
Qty: 30 CAPSULE | Refills: 11 | Status: SHIPPED | OUTPATIENT
Start: 2018-03-01 | End: 2019-02-20 | Stop reason: SDUPTHER

## 2018-03-26 ENCOUNTER — TRANSCRIBE ORDERS (OUTPATIENT)
Dept: ADMINISTRATIVE | Facility: HOSPITAL | Age: 68
End: 2018-03-26

## 2018-03-26 DIAGNOSIS — Z12.31 ENCOUNTER FOR SCREENING MAMMOGRAM FOR MALIGNANT NEOPLASM OF BREAST: Primary | ICD-10-CM

## 2018-04-01 DIAGNOSIS — I47.1 ATRIAL TACHYCARDIA (HCC): ICD-10-CM

## 2018-04-02 RX ORDER — FLECAINIDE ACETATE 100 MG/1
TABLET ORAL
Qty: 60 TABLET | Refills: 11 | Status: SHIPPED | OUTPATIENT
Start: 2018-04-02 | End: 2019-08-12 | Stop reason: SDUPTHER

## 2018-04-23 ENCOUNTER — OFFICE VISIT (OUTPATIENT)
Dept: CARDIOLOGY | Facility: CLINIC | Age: 68
End: 2018-04-23

## 2018-04-23 ENCOUNTER — HOSPITAL ENCOUNTER (OUTPATIENT)
Dept: MAMMOGRAPHY | Facility: HOSPITAL | Age: 68
Discharge: HOME | End: 2018-04-23
Attending: INTERNAL MEDICINE | Admitting: INTERNAL MEDICINE

## 2018-04-23 VITALS
HEIGHT: 66 IN | BODY MASS INDEX: 25.39 KG/M2 | WEIGHT: 158 LBS | DIASTOLIC BLOOD PRESSURE: 60 MMHG | SYSTOLIC BLOOD PRESSURE: 106 MMHG | OXYGEN SATURATION: 99 % | HEART RATE: 58 BPM

## 2018-04-23 DIAGNOSIS — E78.5 HYPERLIPIDEMIA, UNSPECIFIED HYPERLIPIDEMIA TYPE: ICD-10-CM

## 2018-04-23 DIAGNOSIS — Z12.31 ENCOUNTER FOR SCREENING MAMMOGRAM FOR MALIGNANT NEOPLASM OF BREAST: ICD-10-CM

## 2018-04-23 DIAGNOSIS — R00.1 SINUS BRADYCARDIA: ICD-10-CM

## 2018-04-23 DIAGNOSIS — I47.1 ATRIAL TACHYCARDIA (HCC): Primary | ICD-10-CM

## 2018-04-23 PROCEDURE — 77063 BREAST TOMOSYNTHESIS BI: CPT

## 2018-04-23 PROCEDURE — 99213 OFFICE O/P EST LOW 20 MIN: CPT | Performed by: INTERNAL MEDICINE

## 2018-04-23 PROCEDURE — 77067 SCR MAMMO BI INCL CAD: CPT

## 2018-04-23 NOTE — PROGRESS NOTES
Reason for Visit: cardiovascular follow up.    HPI:  Reshma Crouch is a 68 y.o. female is here today for 6 month follow-up.  She has been doing well and not having any issues or problems.  She denies any tachycardia, palpitations, chest pain, dizziness, syncope, PND, or orthopnea.  Continues to work 40 hours a week and is active.  Saw Dr. Moe around February and reports that everything is been doing well.  Blood pressure is been well controlled.    Previous Cardiac Testing and Procedures:  - Echo (03/30/2016) EF 60-65%, normal RV size and function, normal valves    Patient Active Problem List   Diagnosis   • ANTONIO (iron deficiency anemia)   • Paroxysmal SVT (supraventricular tachycardia)   • AV block   • Hyperlipidemia   • Atrial tachycardia   • History of nephrectomy       Social History   Substance Use Topics   • Smoking status: Never Smoker   • Smokeless tobacco: Never Used   • Alcohol use No       Family History   Problem Relation Age of Onset   • Cirrhosis Mother      NON ALCOHOLIC CIRRHOSIS   • Other Father      NEPHRITIS/KIDNEY TRANSPLANT   • Breast cancer Neg Hx    • Colon cancer Neg Hx    • Esophageal cancer Neg Hx        The following portions of the patient's history were reviewed and updated as appropriate: allergies, current medications, past family history, past medical history, past social history, past surgical history and problem list.      Current Outpatient Prescriptions:   •  atorvastatin (LIPITOR) 20 MG tablet, Take 20 mg by mouth Every Night., Disp: , Rfl:   •  CARTIA  MG 24 hr capsule, TAKE ONE CAPSULE BY MOUTH ONCE DAILY, Disp: 30 capsule, Rfl: 11  •  flecainide (TAMBOCOR) 100 MG tablet, TAKE ONE TABLET BY MOUTH TWICE DAILY, Disp: 60 tablet, Rfl: 11    Review of Systems   Constitution: Negative for chills and fever.   Cardiovascular: Negative for chest pain and paroxysmal nocturnal dyspnea.   Respiratory: Negative for cough and shortness of breath.    Skin: Negative for rash.  "  Gastrointestinal: Negative for abdominal pain and heartburn.   Neurological: Negative for dizziness and numbness.       Objective   /60 (BP Location: Left arm, Patient Position: Sitting, Cuff Size: Adult)   Pulse 58   Ht 167.6 cm (65.98\")   Wt 71.7 kg (158 lb)   SpO2 99%   BMI 25.51 kg/m²   Physical Exam   Constitutional: She is oriented to person, place, and time. She appears well-developed and well-nourished.   HENT:   Head: Normocephalic and atraumatic.   Cardiovascular: Regular rhythm and normal heart sounds.  Bradycardia present.    No murmur heard.  Pulmonary/Chest: Effort normal and breath sounds normal.   Musculoskeletal: She exhibits no edema.   Neurological: She is alert and oriented to person, place, and time.   Skin: Skin is warm and dry.   Psychiatric: She has a normal mood and affect.     Procedures      ICD-10-CM ICD-9-CM   1. Atrial tachycardia I47.1 427.89   2. Hyperlipidemia, unspecified hyperlipidemia type E78.5 272.4   3. Sinus bradycardia R00.1 427.89         Assessment/Plan:  1.  Atrial tachycardia: No further episodesAnd remained symptom-free.   continues to follow with Dr. Moe of [facility].  Continue therapy with diltiazem and flecainide.     2.  Dyslipidemia: Continue atorvastatin.  Lipid panel followed by Dr Xavier.       3.  Sinus bradycardia: Mild and secondary to diltiazem.  Remains asymptomatic.   "

## 2019-02-20 DIAGNOSIS — I47.1 ATRIAL TACHYCARDIA (HCC): ICD-10-CM

## 2019-02-21 RX ORDER — DILTIAZEM HYDROCHLORIDE 120 MG/1
CAPSULE, EXTENDED RELEASE ORAL
Qty: 30 CAPSULE | Refills: 11 | Status: SHIPPED | OUTPATIENT
Start: 2019-02-21 | End: 2019-08-12 | Stop reason: SDUPTHER

## 2019-08-12 ENCOUNTER — OFFICE VISIT (OUTPATIENT)
Dept: CARDIOLOGY | Facility: CLINIC | Age: 69
End: 2019-08-12

## 2019-08-12 VITALS
OXYGEN SATURATION: 98 % | HEIGHT: 66 IN | SYSTOLIC BLOOD PRESSURE: 110 MMHG | DIASTOLIC BLOOD PRESSURE: 64 MMHG | BODY MASS INDEX: 26.36 KG/M2 | HEART RATE: 64 BPM | WEIGHT: 164 LBS

## 2019-08-12 DIAGNOSIS — I47.1 ATRIAL TACHYCARDIA (HCC): Primary | ICD-10-CM

## 2019-08-12 DIAGNOSIS — E78.5 HYPERLIPIDEMIA, UNSPECIFIED HYPERLIPIDEMIA TYPE: ICD-10-CM

## 2019-08-12 PROCEDURE — 99213 OFFICE O/P EST LOW 20 MIN: CPT | Performed by: INTERNAL MEDICINE

## 2019-08-12 RX ORDER — FLECAINIDE ACETATE 100 MG/1
100 TABLET ORAL 2 TIMES DAILY
Qty: 180 TABLET | Refills: 3 | Status: SHIPPED | OUTPATIENT
Start: 2019-08-12 | End: 2020-03-16 | Stop reason: SDUPTHER

## 2019-08-12 RX ORDER — DILTIAZEM HYDROCHLORIDE 120 MG/1
120 CAPSULE, COATED, EXTENDED RELEASE ORAL DAILY
Qty: 90 CAPSULE | Refills: 3 | Status: SHIPPED | OUTPATIENT
Start: 2019-08-12 | End: 2020-03-16 | Stop reason: SDUPTHER

## 2019-08-12 NOTE — PROGRESS NOTES
Reason for Visit: cardiovascular follow up.    HPI:  Reshma Crouch is a 69 y.o. female is here today for follow-up.  She has been doing well not having any issues or complaints.  She denies any chest pain, palpitations, dizziness, syncope, PND, or orthopnea.  She follows with Dr. Moe every year and sees him next time in February.  She has not had any recurrence of the atrial tachycardia since being on the diltiazem and flecainide.  Dr. Crane monitors her lipid panel regularly.    Previous Cardiac Testing and Procedures:  - Echo (03/30/2016) EF 60-65%, normal RV size and function, normal valves    Patient Active Problem List   Diagnosis   • ANTONIO (iron deficiency anemia)   • Paroxysmal SVT (supraventricular tachycardia) (CMS/HCC)   • Hyperlipidemia   • Atrial tachycardia (CMS/HCC)   • History of nephrectomy       Social History     Tobacco Use   • Smoking status: Never Smoker   • Smokeless tobacco: Never Used   Substance Use Topics   • Alcohol use: No   • Drug use: No       Family History   Problem Relation Age of Onset   • Cirrhosis Mother         NON ALCOHOLIC CIRRHOSIS   • Other Father         NEPHRITIS/KIDNEY TRANSPLANT   • Breast cancer Neg Hx    • Colon cancer Neg Hx    • Esophageal cancer Neg Hx        The following portions of the patient's history were reviewed and updated as appropriate: allergies, current medications, past family history, past medical history, past social history, past surgical history and problem list.      Current Outpatient Medications:   •  atorvastatin (LIPITOR) 20 MG tablet, Take 20 mg by mouth Every Night., Disp: , Rfl:   •  diltiaZEM CD (CARTIA XT) 120 MG 24 hr capsule, Take 1 capsule by mouth Daily., Disp: 90 capsule, Rfl: 3  •  flecainide (TAMBOCOR) 100 MG tablet, Take 1 tablet by mouth 2 (Two) Times a Day., Disp: 180 tablet, Rfl: 3    Review of Systems   Constitution: Negative for chills and fever.   Cardiovascular: Negative for chest pain and paroxysmal nocturnal dyspnea.  "  Respiratory: Negative for cough and shortness of breath.    Skin: Negative for rash.   Gastrointestinal: Negative for abdominal pain and heartburn.   Neurological: Negative for dizziness and numbness.       Objective   /64 (BP Location: Left arm, Patient Position: Sitting, Cuff Size: Adult)   Pulse 64   Ht 167.6 cm (66\")   Wt 74.4 kg (164 lb)   SpO2 98%   BMI 26.47 kg/m²   Physical Exam   Constitutional: She is oriented to person, place, and time. She appears well-developed and well-nourished.   HENT:   Head: Normocephalic and atraumatic.   Cardiovascular: Normal rate, regular rhythm and normal heart sounds.   No murmur heard.  Pulmonary/Chest: Effort normal and breath sounds normal.   Musculoskeletal: She exhibits no edema.   Neurological: She is alert and oriented to person, place, and time.   Skin: Skin is warm and dry.   Psychiatric: She has a normal mood and affect.     Procedures      ICD-10-CM ICD-9-CM   1. Atrial tachycardia (CMS/[facility]) I47.1 427.89   2. Hyperlipidemia, unspecified hyperlipidemia type E78.5 272.4         Assessment/Plan:  1. Atrial tachycardia: No further episodes or events.  Continue rhythm control with flecainide and diltiazem.  Follows with Dr. Moe and next appointment in February.    2.  Dyslipidemia: Continue atorvastatin per Dr. Xavier.        "

## 2020-03-16 DIAGNOSIS — I47.1 ATRIAL TACHYCARDIA (HCC): ICD-10-CM

## 2020-03-16 RX ORDER — DILTIAZEM HYDROCHLORIDE 120 MG/1
120 CAPSULE, COATED, EXTENDED RELEASE ORAL DAILY
Qty: 90 CAPSULE | Refills: 3 | Status: SHIPPED | OUTPATIENT
Start: 2020-03-16 | End: 2023-02-10 | Stop reason: SDUPTHER

## 2020-03-16 RX ORDER — FLECAINIDE ACETATE 100 MG/1
100 TABLET ORAL 2 TIMES DAILY
Qty: 180 TABLET | Refills: 3 | Status: SHIPPED | OUTPATIENT
Start: 2020-03-16 | End: 2021-08-23 | Stop reason: SDUPTHER

## 2020-08-17 ENCOUNTER — OFFICE VISIT (OUTPATIENT)
Dept: CARDIOLOGY | Facility: CLINIC | Age: 70
End: 2020-08-17

## 2020-08-17 VITALS
BODY MASS INDEX: 25.07 KG/M2 | SYSTOLIC BLOOD PRESSURE: 152 MMHG | OXYGEN SATURATION: 99 % | DIASTOLIC BLOOD PRESSURE: 70 MMHG | WEIGHT: 156 LBS | HEART RATE: 46 BPM | HEIGHT: 66 IN

## 2020-08-17 DIAGNOSIS — E78.5 HYPERLIPIDEMIA, UNSPECIFIED HYPERLIPIDEMIA TYPE: ICD-10-CM

## 2020-08-17 DIAGNOSIS — I47.1 ATRIAL TACHYCARDIA (HCC): Primary | ICD-10-CM

## 2020-08-17 DIAGNOSIS — R00.1 BRADYCARDIA, SINUS: ICD-10-CM

## 2020-08-17 DIAGNOSIS — I10 ESSENTIAL HYPERTENSION: ICD-10-CM

## 2020-08-17 PROCEDURE — 93000 ELECTROCARDIOGRAM COMPLETE: CPT | Performed by: INTERNAL MEDICINE

## 2020-08-17 PROCEDURE — 99214 OFFICE O/P EST MOD 30 MIN: CPT | Performed by: INTERNAL MEDICINE

## 2020-08-17 NOTE — PROGRESS NOTES
Reason for Visit: cardiovascular follow up.    HPI:  Reshma Crouch is a 70 y.o. female is here today for 1 year follow-up.  She is doing well not having any issues or complaints.  She denies any chest pain, palpitations, dizziness, syncope, PND, or orthopnea.  She saw Dr. Moe in June and was doing well at that time.  Her symptoms and tachycardia have remained controlled on the diltiazem and flecainide.  Her blood pressure is elevated today but she reports that this is likely due to her running around a lot to get here for her appointment.  She has an upcoming appointment with Dr. Xavier and she will check on it again there.    Previous Cardiac Testing and Procedures:  - Echo (03/30/2016) EF 60-65%, normal RV size and function, normal valves  - Lipid panel (5/12/2016) total cholesterol 115, HDL 37, LDL 62, triglycerides 95    Patient Active Problem List   Diagnosis   • ANTONIO (iron deficiency anemia)   • Paroxysmal SVT (supraventricular tachycardia) (CMS/HCC)   • Hyperlipidemia   • Atrial tachycardia (CMS/HCC)   • History of nephrectomy   • Essential hypertension       Social History     Tobacco Use   • Smoking status: Never Smoker   • Smokeless tobacco: Never Used   Substance Use Topics   • Alcohol use: No   • Drug use: No       Family History   Problem Relation Age of Onset   • Cirrhosis Mother         NON ALCOHOLIC CIRRHOSIS   • Other Father         NEPHRITIS/KIDNEY TRANSPLANT   • Breast cancer Neg Hx    • Colon cancer Neg Hx    • Esophageal cancer Neg Hx        The following portions of the patient's history were reviewed and updated as appropriate: allergies, current medications, past family history, past medical history, past social history, past surgical history and problem list.      Current Outpatient Medications:   •  atorvastatin (LIPITOR) 20 MG tablet, Take 20 mg by mouth Every Night., Disp: , Rfl:   •  dilTIAZem CD (CARTIA XT) 120 MG 24 hr capsule, Take 1 capsule by mouth Daily., Disp: 90 capsule, Rfl:  "3  •  flecainide (TAMBOCOR) 100 MG tablet, Take 1 tablet by mouth 2 (Two) Times a Day., Disp: 180 tablet, Rfl: 3    Review of Systems   Constitution: Negative for chills and fever.   Cardiovascular: Negative for chest pain and paroxysmal nocturnal dyspnea.   Respiratory: Negative for cough and shortness of breath.    Skin: Negative for rash.   Gastrointestinal: Negative for abdominal pain and heartburn.   Neurological: Negative for dizziness and numbness.       Objective   /70 (BP Location: Left arm, Patient Position: Sitting, Cuff Size: Adult)   Pulse (!) 46   Ht 167.6 cm (66\")   Wt 70.8 kg (156 lb)   SpO2 99%   BMI 25.18 kg/m²   Physical Exam   Constitutional: She is oriented to person, place, and time. She appears well-developed and well-nourished.   HENT:   Head: Normocephalic and atraumatic.   Cardiovascular: Regular rhythm and normal heart sounds. Bradycardia present.   No murmur heard.  Pulmonary/Chest: Effort normal and breath sounds normal.   Musculoskeletal: She exhibits no edema.   Neurological: She is alert and oriented to person, place, and time.   Skin: Skin is warm and dry.   Psychiatric: She has a normal mood and affect.       ECG 12 Lead  Date/Time: 8/17/2020 8:39 AM  Performed by: Tee Lentz MD  Authorized by: Tee Lentz MD   Comparison: compared with previous ECG from 9/18/2017  Similar to previous ECG  Rhythm: sinus bradycardia  QRS axis: left  Other findings: low voltage and poor R wave progression              ICD-10-CM ICD-9-CM   1. Atrial tachycardia (CMS/HCC) I47.1 427.89   2. Hyperlipidemia, unspecified hyperlipidemia type E78.5 272.4   3. Bradycardia, sinus R00.1 427.89   4. Essential hypertension I10 401.9         Assessment/Plan:  1. Atrial tachycardia: Remains well controlled on diltiazem and flecainide.  Follows with Dr. Moe of [facility] as well.       2.  Dyslipidemia: Well controlled on atorvastatin.        3.  Sinus bradycardia: Secondary to diltiazem and remains " asymptomatic.    4.  Essential hypertension: Does not have a previous diagnosis of hypertension.  Her blood pressure is elevated today.  We will continue to monitor and consider antihypertensive therapy if it remains elevated.

## 2021-08-23 ENCOUNTER — OFFICE VISIT (OUTPATIENT)
Dept: CARDIOLOGY | Facility: CLINIC | Age: 71
End: 2021-08-23

## 2021-08-23 VITALS
SYSTOLIC BLOOD PRESSURE: 122 MMHG | WEIGHT: 156 LBS | BODY MASS INDEX: 25.07 KG/M2 | HEIGHT: 66 IN | OXYGEN SATURATION: 99 % | DIASTOLIC BLOOD PRESSURE: 78 MMHG | HEART RATE: 52 BPM

## 2021-08-23 DIAGNOSIS — I10 ESSENTIAL HYPERTENSION: ICD-10-CM

## 2021-08-23 DIAGNOSIS — I47.1 ATRIAL TACHYCARDIA (HCC): Primary | ICD-10-CM

## 2021-08-23 DIAGNOSIS — R00.1 BRADYCARDIA, SINUS: ICD-10-CM

## 2021-08-23 DIAGNOSIS — E78.5 HYPERLIPIDEMIA, UNSPECIFIED HYPERLIPIDEMIA TYPE: ICD-10-CM

## 2021-08-23 PROCEDURE — 99214 OFFICE O/P EST MOD 30 MIN: CPT | Performed by: INTERNAL MEDICINE

## 2021-08-23 RX ORDER — FLECAINIDE ACETATE 100 MG/1
100 TABLET ORAL 2 TIMES DAILY
Qty: 180 TABLET | Refills: 3 | Status: SHIPPED | OUTPATIENT
Start: 2021-08-23 | End: 2022-08-29 | Stop reason: SDUPTHER

## 2021-08-23 NOTE — PROGRESS NOTES
Reason for Visit: cardiovascular follow up.    HPI:  Reshma Crouch is a 71 y.o. female is here today for 1 year follow-up. She has been doing well and not having any issues or complaints.  She has not had any palpitations, or tachycardia.  She also denies any chest pain, dizziness, syncope, PND, or orthopnea.  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  Her blood pressure is normal.      Previous Cardiac Testing and Procedures:  - Echo (03/30/2016) EF 60-65%, normal RV size and function, normal valves  - Lipid panel (5/12/2016) total cholesterol 115, HDL 37, LDL 62, triglycerides 95    Patient Active Problem List   Diagnosis   • ANTONIO (iron deficiency anemia)   • Paroxysmal SVT (supraventricular tachycardia) (CMS/HCC)   • Hyperlipidemia   • Atrial tachycardia (CMS/HCC)   • History of nephrectomy   • Essential hypertension       Social History     Tobacco Use   • Smoking status: Never Smoker   • Smokeless tobacco: Never Used   Substance Use Topics   • Alcohol use: No   • Drug use: No       Family History   Problem Relation Age of Onset   • Cirrhosis Mother         NON ALCOHOLIC CIRRHOSIS   • Other Father         NEPHRITIS/KIDNEY TRANSPLANT   • Breast cancer Neg Hx    • Colon cancer Neg Hx    • Esophageal cancer Neg Hx        The following portions of the patient's history were reviewed and updated as appropriate: allergies, current medications, past family history, past medical history, past social history, past surgical history and problem list.      Current Outpatient Medications:   •  atorvastatin (LIPITOR) 20 MG tablet, Take 20 mg by mouth Every Night., Disp: , Rfl:   •  dilTIAZem CD (CARTIA XT) 120 MG 24 hr capsule, Take 1 capsule by mouth Daily., Disp: 90 capsule, Rfl: 3  •  flecainide (TAMBOCOR) 100 MG tablet, Take 1 tablet by mouth 2 (Two) Times a Day., Disp: 180 tablet, Rfl: 3    Review of Systems   Constitutional: Negative for chills and fever.   Cardiovascular: Negative for chest pain  "and paroxysmal nocturnal dyspnea.   Respiratory: Negative for cough and shortness of breath.    Skin: Negative for rash.   Gastrointestinal: Negative for abdominal pain and heartburn.   Neurological: Negative for dizziness and numbness.       Objective   /78 (BP Location: Left arm, Patient Position: Sitting, Cuff Size: Adult)   Pulse 52   Ht 167.6 cm (66\")   Wt 70.8 kg (156 lb)   SpO2 99%   BMI 25.18 kg/m²   Constitutional:       Appearance: Well-developed.   HENT:      Head: Normocephalic and atraumatic.   Pulmonary:      Effort: Pulmonary effort is normal.      Breath sounds: Normal breath sounds.   Cardiovascular:      Bradycardia present. Regular rhythm.      Murmurs: There is no murmur.      No gallop. No click.   Edema:     Peripheral edema absent.   Skin:     General: Skin is warm and dry.   Neurological:      Mental Status: Alert and oriented to person, place, and time.       Procedures      ICD-10-CM ICD-9-CM   1. Atrial tachycardia (CMS/HCC)  I47.1 427.89   2. Hyperlipidemia, unspecified hyperlipidemia type  E78.5 272.4   3. Bradycardia, sinus  R00.1 427.89   4. Essential hypertension  I10 401.9         Assessment/Plan:  1. Atrial tachycardia:  She remains asymptomatic with no evidence of recent recurrence.  Continue diltiazem and flecainide.  Remains well controlled on diltiazem and flecainide.  Follows with Dr. Moe of EP  every other year.       2.  Hyperlipidemia:  Continue atorvastatin.     3.  Sinus bradycardia:  Ultimately drug-induced fom diltiazem.  Continue as long as she remains asymptomatic.     4.  Essential hypertension: Blood pressure was elevated last visit but is normal today.  Did not have a previous diagnosis of hypertension.  Continue diltiazem.  "

## 2022-03-06 ENCOUNTER — APPOINTMENT (OUTPATIENT)
Dept: CT IMAGING | Facility: HOSPITAL | Age: 72
End: 2022-03-06

## 2022-03-06 ENCOUNTER — HOSPITAL ENCOUNTER (EMERGENCY)
Facility: HOSPITAL | Age: 72
Discharge: HOME | End: 2022-03-06
Attending: FAMILY MEDICINE | Admitting: FAMILY MEDICINE

## 2022-03-06 VITALS
BODY MASS INDEX: 25.71 KG/M2 | TEMPERATURE: 98.2 F | RESPIRATION RATE: 16 BRPM | OXYGEN SATURATION: 99 % | SYSTOLIC BLOOD PRESSURE: 126 MMHG | DIASTOLIC BLOOD PRESSURE: 44 MMHG | HEART RATE: 54 BPM | HEIGHT: 66 IN | WEIGHT: 160 LBS

## 2022-03-06 DIAGNOSIS — R42 VERTIGO: Primary | ICD-10-CM

## 2022-03-06 LAB
ALBUMIN SERPL-MCNC: 4.3 G/DL (ref 3.5–5.2)
ALBUMIN/GLOB SERPL: 1.5 G/DL
ALP SERPL-CCNC: 64 U/L (ref 39–117)
ALT SERPL W P-5'-P-CCNC: 9 U/L (ref 1–33)
ANION GAP SERPL CALCULATED.3IONS-SCNC: 10 MMOL/L (ref 5–15)
AST SERPL-CCNC: 16 U/L (ref 1–32)
BASOPHILS # BLD AUTO: 0.03 10*3/MM3 (ref 0–0.2)
BASOPHILS NFR BLD AUTO: 0.5 % (ref 0–1.5)
BILIRUB SERPL-MCNC: 0.3 MG/DL (ref 0–1.2)
BUN SERPL-MCNC: 23 MG/DL (ref 8–23)
BUN/CREAT SERPL: 17.7 (ref 7–25)
CALCIUM SPEC-SCNC: 9.2 MG/DL (ref 8.6–10.5)
CHLORIDE SERPL-SCNC: 105 MMOL/L (ref 98–107)
CO2 SERPL-SCNC: 25 MMOL/L (ref 22–29)
CREAT SERPL-MCNC: 1.3 MG/DL (ref 0.57–1)
DEPRECATED RDW RBC AUTO: 43.4 FL (ref 37–54)
EGFRCR SERPLBLD CKD-EPI 2021: 44.3 ML/MIN/1.73
EOSINOPHIL # BLD AUTO: 0.21 10*3/MM3 (ref 0–0.4)
EOSINOPHIL NFR BLD AUTO: 3.4 % (ref 0.3–6.2)
ERYTHROCYTE [DISTWIDTH] IN BLOOD BY AUTOMATED COUNT: 12.4 % (ref 12.3–15.4)
GLOBULIN UR ELPH-MCNC: 2.8 GM/DL
GLUCOSE SERPL-MCNC: 107 MG/DL (ref 65–99)
HCT VFR BLD AUTO: 40 % (ref 34–46.6)
HGB BLD-MCNC: 12.8 G/DL (ref 12–15.9)
IMM GRANULOCYTES # BLD AUTO: 0.01 10*3/MM3 (ref 0–0.05)
IMM GRANULOCYTES NFR BLD AUTO: 0.2 % (ref 0–0.5)
LYMPHOCYTES # BLD AUTO: 1.46 10*3/MM3 (ref 0.7–3.1)
LYMPHOCYTES NFR BLD AUTO: 23.5 % (ref 19.6–45.3)
MCH RBC QN AUTO: 30.3 PG (ref 26.6–33)
MCHC RBC AUTO-ENTMCNC: 32 G/DL (ref 31.5–35.7)
MCV RBC AUTO: 94.8 FL (ref 79–97)
MONOCYTES # BLD AUTO: 0.43 10*3/MM3 (ref 0.1–0.9)
MONOCYTES NFR BLD AUTO: 6.9 % (ref 5–12)
NEUTROPHILS NFR BLD AUTO: 4.08 10*3/MM3 (ref 1.7–7)
NEUTROPHILS NFR BLD AUTO: 65.5 % (ref 42.7–76)
NRBC BLD AUTO-RTO: 0 /100 WBC (ref 0–0.2)
PLATELET # BLD AUTO: 202 10*3/MM3 (ref 140–450)
PMV BLD AUTO: 10 FL (ref 6–12)
POTASSIUM SERPL-SCNC: 3.6 MMOL/L (ref 3.5–5.2)
PROT SERPL-MCNC: 7.1 G/DL (ref 6–8.5)
RBC # BLD AUTO: 4.22 10*6/MM3 (ref 3.77–5.28)
SODIUM SERPL-SCNC: 140 MMOL/L (ref 136–145)
TROPONIN T SERPL-MCNC: <0.01 NG/ML (ref 0–0.03)
WBC NRBC COR # BLD: 6.22 10*3/MM3 (ref 3.4–10.8)

## 2022-03-06 PROCEDURE — 36415 COLL VENOUS BLD VENIPUNCTURE: CPT

## 2022-03-06 PROCEDURE — 85025 COMPLETE CBC W/AUTO DIFF WBC: CPT | Performed by: FAMILY MEDICINE

## 2022-03-06 PROCEDURE — 93010 ELECTROCARDIOGRAM REPORT: CPT | Performed by: INTERNAL MEDICINE

## 2022-03-06 PROCEDURE — 99283 EMERGENCY DEPT VISIT LOW MDM: CPT

## 2022-03-06 PROCEDURE — 93005 ELECTROCARDIOGRAM TRACING: CPT | Performed by: FAMILY MEDICINE

## 2022-03-06 PROCEDURE — 80053 COMPREHEN METABOLIC PANEL: CPT | Performed by: FAMILY MEDICINE

## 2022-03-06 PROCEDURE — 84484 ASSAY OF TROPONIN QUANT: CPT | Performed by: FAMILY MEDICINE

## 2022-03-06 PROCEDURE — 70450 CT HEAD/BRAIN W/O DYE: CPT

## 2022-03-07 NOTE — ED PROVIDER NOTES
Subjective   This patient is a 73-year-old female with a history of previous episodes of vertigo that she treats with motion sickness tablets.  Today she was making some food in the kitchen when she had another episode of profound vertiginous dizziness.  This was not associated with any other neurologic symptoms.  She said that she made it to a chair and sat for a few minutes, took her motion sickness pill and the symptoms resolved over the next few minutes.  She has had no fever or other systemic symptoms.  In the ED she is symptom-free.  She denies headache with any of these symptoms.          Review of Systems   Neurological: Positive for dizziness.   All other systems reviewed and are negative.      Past Medical History:   Diagnosis Date   • Atrial tachycardia (HCC)    • AV block    • Hospital discharge follow-up    • Hyperlipidemia    • ANTONIO (iron deficiency anemia)    • Paroxysmal SVT (supraventricular tachycardia) (HCC)        Allergies   Allergen Reactions   • Novocain [Procaine] Other (See Comments)     LOCAL ANESTHETICS-Parenteral, almost passed out         Past Surgical History:   Procedure Laterality Date   •  SECTION     • CHOLECYSTECTOMY     • COLONOSCOPY N/A 2017    Procedure: COLONOSCOPY WITH ANESTHESIA;  Surgeon: Clifford Huynh DO;  Location: [facility] ENDOSCOPY;  Service:    • NEPHRECTOMY Left 2017    DR. MORALES IN [place]    • TONSILLECTOMY         Family History   Problem Relation Age of Onset   • Cirrhosis Mother         NON ALCOHOLIC CIRRHOSIS   • Other Father         NEPHRITIS/KIDNEY TRANSPLANT   • Breast cancer Neg Hx    • Colon cancer Neg Hx    • Esophageal cancer Neg Hx        Social History     Socioeconomic History   • Marital status: Single   Tobacco Use   • Smoking status: Never Smoker   • Smokeless tobacco: Never Used   Substance and Sexual Activity   • Alcohol use: No   • Drug use: No   • Sexual activity: Defer           Objective   Physical Exam  Vitals and  nursing note reviewed.   Constitutional:       Appearance: She is well-developed.   HENT:      Head: Normocephalic and atraumatic.      Right Ear: External ear normal.      Left Ear: External ear normal.      Nose: Nose normal.      Mouth/Throat:      Mouth: Mucous membranes are moist.      Pharynx: Oropharynx is clear.   Eyes:      Conjunctiva/sclera: Conjunctivae normal.   Cardiovascular:      Rate and Rhythm: Normal rate and regular rhythm.      Heart sounds: Normal heart sounds.   Pulmonary:      Effort: Pulmonary effort is normal.      Breath sounds: Normal breath sounds.   Abdominal:      General: Bowel sounds are normal.      Palpations: Abdomen is soft.   Musculoskeletal:         General: Normal range of motion.      Cervical back: Normal range of motion and neck supple.   Skin:     General: Skin is warm and dry.      Capillary Refill: Capillary refill takes less than 2 seconds.   Neurological:      General: No focal deficit present.      Mental Status: She is alert and oriented to person, place, and time.      GCS: GCS eye subscore is 4. GCS verbal subscore is 5. GCS motor subscore is 6.      Cranial Nerves: Cranial nerves are intact.      Sensory: Sensation is intact.      Motor: Motor function is intact.      Coordination: Coordination is intact.      Gait: Gait is intact.      Comments: [place] was negative bilaterally   Psychiatric:         Behavior: Behavior normal.         Thought Content: Thought content normal.         Judgment: Judgment normal.         Procedures           ED Course                                                 MDM  Number of Diagnoses or Management Options     Amount and/or Complexity of Data Reviewed  Clinical lab tests: reviewed and ordered  Tests in the radiology section of CPT®: ordered and reviewed  Tests in the medicine section of CPT®: reviewed and ordered    Patient Progress  Patient progress: stable      Final diagnoses:   Vertigo       ED Disposition  ED  Disposition     ED Disposition   Discharge    Condition   Stable    Comment   --             Davey Xavier MD  [facility] [address] [place] [address] [id number] [phone]               Medication List      No changes were made to your prescriptions during this visit.       The patient's work-up in the ED was negative.  She also had an entirely normal physical exam.  In the context of a lady has had previous vertiginous episodes and with this episode being quite difficult for BPPV, i.e. not prolonged and not associated with any other neurologic symptoms, I am comfortable discharging the patient home with symptomatic treatment.  She does however clearly understand to return if the vertigo returns, persists or is associated with any neurologic symptoms.  Right now though she is stable for discharge.     Maximus Colón MD  03/06/22 2021

## 2022-03-07 NOTE — EXTERNAL PATIENT INSTRUCTIONS
Patient Education   Table of Contents       Vertigo     To view videos and all your education online visit,   [email or web address]   or scan this QR code with your smartphone.                  Vertigo     Vertigo is the feeling that you or the things around you are moving when they are not. This feeling can come and go at any time. Vertigo often goes away on its own. This condition can be dangerous if it happens when you are doing activities like driving or working with machines.   Your doctor will do tests to find the cause of your vertigo. These tests will also help your doctor decide on the best treatment for you.   Follow these instructions at home:   Eating and drinking               Drink enough fluid to keep your pee (urine) pale yellow.      Do not  drink alcohol.       Activity         Return to your normal activities as told by your doctor. Ask your doctor what activities are safe for you.       In the morning, first sit up on the side of the bed. When you feel okay, stand slowly while you hold onto something until you know that your balance is fine.       Move slowly. Avoid sudden body or head movements or certain positions, as told by your doctor.       Use a cane if you have trouble standing or walking.       Sit down right away if you feel dizzy.       Avoid doing any tasks or activities that can cause danger to you or others if you get dizzy.       Avoid bending down if you feel dizzy. Place items in your home so that they are easy for you to reach without leaning over.      Do not  drive or use heavy machinery if you feel dizzy.     General instructions         Take over-the-counter and prescription medicines only as told by your doctor.       Keep all follow-up visits as told by your doctor. This is important.       Contact a doctor if:         Your medicine does not help your vertigo.       You have a fever.       Your problems get worse or you have new symptoms.       Your family or  friends see changes in your behavior.       The feeling of being sick to your stomach gets worse.       Your vomiting gets worse.       You lose feeling (have numbness) in part of your body.       You feel prickling and tingling in a part of your body.     Get help right away if:         You have trouble moving or talking.       You are always dizzy.       You pass out (faint).       You get very bad headaches.       You feel weak in your hands, arms, or legs.       You have changes in your hearing.       You have changes in how you see (vision).       You get a stiff neck.       Bright light starts to bother you.     Summary         Vertigo is the feeling that you or the things around you are moving when they are not.       Your doctor will do tests to find the cause of your vertigo.       You may be told to avoid some tasks, positions, or movements.       Contact a doctor if your medicine is not helping, or if you have a fever, new symptoms, or a change in behavior.       Get help right away if you get very bad headaches, or if you have changes in how you speak, hear, or see.     This information is not intended to replace advice given to you by your health care provider. Make sure you discuss any questions you have with your health care provider.     Document Released: 09/26/2009Document Revised: 11/11/2019Document Reviewed: 11/11/2019     [employer] Patient Education ? 2021 [employer]

## 2022-03-08 LAB
QT INTERVAL: 486 MS
QTC INTERVAL: 468 MS

## 2022-08-29 ENCOUNTER — OFFICE VISIT (OUTPATIENT)
Dept: CARDIOLOGY | Facility: CLINIC | Age: 72
End: 2022-08-29

## 2022-08-29 VITALS — BODY MASS INDEX: 24.91 KG/M2 | HEIGHT: 66 IN | WEIGHT: 155 LBS | OXYGEN SATURATION: 98 %

## 2022-08-29 DIAGNOSIS — E78.5 HYPERLIPIDEMIA, UNSPECIFIED HYPERLIPIDEMIA TYPE: ICD-10-CM

## 2022-08-29 DIAGNOSIS — R00.1 BRADYCARDIA, SINUS: ICD-10-CM

## 2022-08-29 DIAGNOSIS — I47.1 ATRIAL TACHYCARDIA: Primary | ICD-10-CM

## 2022-08-29 DIAGNOSIS — I10 ESSENTIAL HYPERTENSION: ICD-10-CM

## 2022-08-29 PROCEDURE — 99214 OFFICE O/P EST MOD 30 MIN: CPT | Performed by: INTERNAL MEDICINE

## 2022-08-29 PROCEDURE — 93000 ELECTROCARDIOGRAM COMPLETE: CPT | Performed by: INTERNAL MEDICINE

## 2022-08-29 RX ORDER — FLECAINIDE ACETATE 100 MG/1
100 TABLET ORAL 2 TIMES DAILY
Qty: 180 TABLET | Refills: 3 | Status: SHIPPED | OUTPATIENT
Start: 2022-08-29

## 2022-08-29 NOTE — PROGRESS NOTES
Reason for Visit: cardiovascular follow up.    HPI:  Rsehma Crouch is a 72 y.o. female is here today for 1 year follow-up.  She is doing well for the most part.  She has been more relaxed with her diet and eating more haddad and cheese.  Dr. Starr increased her atorvastatin up to 40 mg due to her worsening lipid panel.  She has not had any cardiac issues.  She reports the diltiazem and flecainide are still working and she has not had any palpitations or tachycardia.    Previous Cardiac Testing and Procedures:  - Echo (03/30/2016) EF 60-65%, normal RV size and function, normal valves  - Lipid panel (5/12/2016) total cholesterol 115, HDL 37, LDL 62, triglycerides 95  - Lipid panel (8/22/2022) total cholesterol 190, HDL 45, , triglycerides 145    Patient Active Problem List   Diagnosis   • ANTONIO (iron deficiency anemia)   • Paroxysmal SVT (supraventricular tachycardia) (HCC)   • Hyperlipidemia   • Atrial tachycardia (HCC)   • History of nephrectomy   • Essential hypertension       Social History     Tobacco Use   • Smoking status: Never Smoker   • Smokeless tobacco: Never Used   Vaping Use   • Vaping Use: Never used   Substance Use Topics   • Alcohol use: No   • Drug use: No       Family History   Problem Relation Age of Onset   • Cirrhosis Mother         NON ALCOHOLIC CIRRHOSIS   • Other Father         NEPHRITIS/KIDNEY TRANSPLANT   • Breast cancer Neg Hx    • Colon cancer Neg Hx    • Esophageal cancer Neg Hx        The following portions of the patient's history were reviewed and updated as appropriate: allergies, current medications, past family history, past medical history, past social history, past surgical history and problem list.      Current Outpatient Medications:   •  atorvastatin (LIPITOR) 40 MG tablet, Take 40 mg by mouth Every Night., Disp: , Rfl:   •  dilTIAZem CD (CARTIA XT) 120 MG 24 hr capsule, Take 1 capsule by mouth Daily., Disp: 90 capsule, Rfl: 3  •  flecainide (TAMBOCOR) 100 MG tablet,  "Take 1 tablet by mouth 2 (Two) Times a Day., Disp: 180 tablet, Rfl: 3    Review of Systems   Constitutional: Negative for chills and fever.   Cardiovascular: Negative for chest pain and paroxysmal nocturnal dyspnea.   Respiratory: Negative for cough and shortness of breath.    Skin: Negative for rash.   Gastrointestinal: Negative for abdominal pain and heartburn.   Neurological: Negative for dizziness and numbness.       Objective   Ht 167.6 cm (65.98\")   Wt 70.3 kg (155 lb)   SpO2 98%   BMI 25.03 kg/m²   Constitutional:       Appearance: Well-developed and normal weight.   HENT:      Head: Normocephalic and atraumatic.   Pulmonary:      Effort: Pulmonary effort is normal.      Breath sounds: Normal breath sounds.   Cardiovascular:      Normal rate. Regular rhythm.      Murmurs: There is no murmur.      No gallop. No click.   Skin:     General: Skin is warm and dry.   Neurological:      Mental Status: Alert and oriented to person, place, and time.         ECG 12 Lead    Date/Time: 8/29/2022 8:31 AM  Performed by: Tee Lentz MD  Authorized by: Tee Lentz MD   Comparison: compared with previous ECG from 3/6/2022  Similar to previous ECG  Rhythm: sinus bradycardia  Conduction: 1st degree AV block  QRS axis: left                ICD-10-CM ICD-9-CM   1. Atrial tachycardia (HCC)  I47.1 427.89   2. Hyperlipidemia, unspecified hyperlipidemia type  E78.5 272.4   3. Bradycardia, sinus  R00.1 427.89   4. Essential hypertension  I10 401.9         Assessment/Plan:  1. Atrial tachycardia:  No evidence of recurrence.  Continue diltiazem and flecainide.  She follows with Dr. Moe of EP and reports next appointment is in 3 years.       2.  Hyperlipidemia: Acceptable control based on lipid panel from 8/22/2022.  Continue atorvastatin at increased dose and work on healthy diet and exercise.    3.  Sinus bradycardia:  Mainly due to diltiazem.  She appears asymptomatic.       4.  Essential hypertension: Blood pressure is " well controlled at home.  Continue diltiazem.

## 2023-02-10 DIAGNOSIS — I47.1 ATRIAL TACHYCARDIA: ICD-10-CM

## 2023-02-13 RX ORDER — DILTIAZEM HYDROCHLORIDE 120 MG/1
120 CAPSULE, COATED, EXTENDED RELEASE ORAL DAILY
Qty: 90 CAPSULE | Refills: 3 | Status: SHIPPED | OUTPATIENT
Start: 2023-02-13

## 2023-08-11 ENCOUNTER — TRANSCRIBE ORDERS (OUTPATIENT)
Dept: ADMINISTRATIVE | Facility: HOSPITAL | Age: 73
End: 2023-08-11
Payer: MEDICARE

## 2023-08-11 DIAGNOSIS — Z78.0 ASYMPTOMATIC MENOPAUSAL STATE: Primary | ICD-10-CM

## 2023-09-11 ENCOUNTER — OFFICE VISIT (OUTPATIENT)
Dept: CARDIOLOGY | Facility: CLINIC | Age: 73
End: 2023-09-11
Payer: MEDICARE

## 2023-09-11 VITALS
OXYGEN SATURATION: 99 % | SYSTOLIC BLOOD PRESSURE: 138 MMHG | DIASTOLIC BLOOD PRESSURE: 76 MMHG | WEIGHT: 166 LBS | HEIGHT: 66 IN | HEART RATE: 48 BPM | BODY MASS INDEX: 26.68 KG/M2

## 2023-09-11 DIAGNOSIS — R00.1 BRADYCARDIA, SINUS: Primary | ICD-10-CM

## 2023-09-11 DIAGNOSIS — I10 ESSENTIAL HYPERTENSION: ICD-10-CM

## 2023-09-11 DIAGNOSIS — I47.1 ATRIAL TACHYCARDIA: ICD-10-CM

## 2023-09-11 DIAGNOSIS — E78.5 HYPERLIPIDEMIA, UNSPECIFIED HYPERLIPIDEMIA TYPE: ICD-10-CM

## 2023-09-11 PROCEDURE — 1160F RVW MEDS BY RX/DR IN RCRD: CPT | Performed by: INTERNAL MEDICINE

## 2023-09-11 PROCEDURE — 93000 ELECTROCARDIOGRAM COMPLETE: CPT | Performed by: INTERNAL MEDICINE

## 2023-09-11 PROCEDURE — 3075F SYST BP GE 130 - 139MM HG: CPT | Performed by: INTERNAL MEDICINE

## 2023-09-11 PROCEDURE — 3078F DIAST BP <80 MM HG: CPT | Performed by: INTERNAL MEDICINE

## 2023-09-11 PROCEDURE — 99214 OFFICE O/P EST MOD 30 MIN: CPT | Performed by: INTERNAL MEDICINE

## 2023-09-11 PROCEDURE — 1159F MED LIST DOCD IN RCRD: CPT | Performed by: INTERNAL MEDICINE

## 2023-09-11 RX ORDER — FLECAINIDE ACETATE 100 MG/1
100 TABLET ORAL 2 TIMES DAILY
Qty: 180 TABLET | Refills: 3 | Status: SHIPPED | OUTPATIENT
Start: 2023-09-11

## 2023-09-11 NOTE — PROGRESS NOTES
Reason for Visit: cardiovascular follow up.    HPI:  Reshma Crouch is a 73 y.o. female is here today for 1 year follow-up.  She is doing well and not having any issues or complaints.  She denies any chest pain, palpitations, dizziness, syncope, PND, or orthopnea.  She remains active and works 3 jobs.  She has no difficulties with any of her activities.  Her heart rate remains low in the 40s today, but she reports being unaffected by this.    Previous Cardiac Testing and Procedures:  - Echo (03/30/2016) EF 60-65%, normal RV size and function, normal valves    Lab data:  - Lipid panel (5/12/2016) total cholesterol 115, HDL 37, LDL 62, triglycerides 95  - Lipid panel (8/22/2022) total cholesterol 190, HDL 45, , triglycerides 145  - Lipid panel (8/9/2023) total cholesterol 189, HDL 45, , triglycerides 116    Patient Active Problem List   Diagnosis    ANTONIO (iron deficiency anemia)    Paroxysmal SVT (supraventricular tachycardia)    Hyperlipidemia    Atrial tachycardia    History of nephrectomy    Essential hypertension       Social History     Tobacco Use    Smoking status: Never    Smokeless tobacco: Never   Vaping Use    Vaping Use: Never used   Substance Use Topics    Alcohol use: No    Drug use: No       Family History   Problem Relation Age of Onset    Cirrhosis Mother         NON ALCOHOLIC CIRRHOSIS    Other Father         NEPHRITIS/KIDNEY TRANSPLANT    Breast cancer Neg Hx     Colon cancer Neg Hx     Esophageal cancer Neg Hx        The following portions of the patient's history were reviewed and updated as appropriate: allergies, current medications, past family history, past medical history, past social history, past surgical history, and problem list.      Current Outpatient Medications:     atorvastatin (LIPITOR) 40 MG tablet, Take 1 tablet by mouth Every Night., Disp: , Rfl:     dilTIAZem CD (Cartia XT) 120 MG 24 hr capsule, Take 1 capsule by mouth Daily., Disp: 90 capsule, Rfl: 3    flecainide  "(TAMBOCOR) 100 MG tablet, Take 1 tablet by mouth 2 (Two) Times a Day., Disp: 180 tablet, Rfl: 3    Review of Systems   Constitutional: Negative for chills and fever.   Cardiovascular:  Negative for chest pain, dyspnea on exertion, palpitations and paroxysmal nocturnal dyspnea.   Respiratory:  Negative for cough and shortness of breath.    Skin:  Negative for rash.   Gastrointestinal:  Negative for abdominal pain and heartburn.   Neurological:  Negative for dizziness and numbness.     Objective   /76 (BP Location: Left arm, Patient Position: Sitting, Cuff Size: Adult)   Pulse (!) 48   Ht 167.6 cm (65.98\")   Wt 75.3 kg (166 lb)   SpO2 99%   BMI 26.81 kg/m²   Constitutional:       Appearance: Well-developed.   HENT:      Head: Normocephalic and atraumatic.   Pulmonary:      Effort: Pulmonary effort is normal.      Breath sounds: Normal breath sounds.   Cardiovascular:      Bradycardia present. Regular rhythm.      Murmurs: There is no murmur.      No gallop.    Edema:     Peripheral edema absent.   Skin:     General: Skin is warm and dry.   Neurological:      Mental Status: Alert and oriented to person, place, and time.       ECG 12 Lead    Date/Time: 9/11/2023 8:38 AM  Performed by: Tee Lentz MD  Authorized by: Tee Lentz MD   Comparison: compared with previous ECG from 8/29/2022  Similar to previous ECG  Rhythm: sinus bradycardia  Conduction: 1st degree AV block  Other findings: low voltage          ICD-10-CM ICD-9-CM   1. Bradycardia, sinus  R00.1 427.89   2. Atrial tachycardia  I47.1 427.89   3. Hyperlipidemia, unspecified hyperlipidemia type  E78.5 272.4   4. Essential hypertension  I10 401.9         Assessment/Plan:  1. Atrial tachycardia: Sinus bradycardia on EKG. she elects to continue diltiazem and flecainide unchanged as she feels this is working well.  Follows with Dr. Moe in EP clinic with next appointment approximately 2 years.    2.  Sinus bradycardia: Chronic and asymptomatic.  " Diltiazem is likely contributing.  We discussed stopping this and she elects to continue as she feels like it is working well for her.     3.  Hyperlipidemia: Lipid panel from 8/2/2023 with mildly elevated LDL with normal HDL, total cholesterol, and triglycerides.  Continue atorvastatin along with lifestyle modification.     4.  Essential hypertension: Blood pressure is borderline today.  Continue current therapy and monitor.

## 2023-11-03 DIAGNOSIS — I47.19 ATRIAL TACHYCARDIA: ICD-10-CM

## 2023-11-03 RX ORDER — FLECAINIDE ACETATE 100 MG/1
100 TABLET ORAL 2 TIMES DAILY
Refills: 0 | OUTPATIENT
Start: 2023-11-03

## 2024-01-04 DIAGNOSIS — I47.19 ATRIAL TACHYCARDIA: ICD-10-CM

## 2024-01-04 RX ORDER — DILTIAZEM HYDROCHLORIDE 120 MG/1
120 CAPSULE, COATED, EXTENDED RELEASE ORAL DAILY
Qty: 90 CAPSULE | Refills: 3 | Status: SHIPPED | OUTPATIENT
Start: 2024-01-04

## 2024-01-08 DIAGNOSIS — I47.19 ATRIAL TACHYCARDIA: ICD-10-CM

## 2024-01-08 RX ORDER — DILTIAZEM HYDROCHLORIDE 120 MG/1
120 CAPSULE, COATED, EXTENDED RELEASE ORAL DAILY
Qty: 90 CAPSULE | Refills: 3 | OUTPATIENT
Start: 2024-01-08

## 2024-01-08 NOTE — TELEPHONE ENCOUNTER
Caller: Reshma Crouch    Relationship: Self    Best call back number: [phone]    Requested Prescriptions:   Requested Prescriptions     Pending Prescriptions Disp Refills    dilTIAZem CD (CARDIZEM CD) 120 MG 24 hr capsule 90 capsule 3     Sig: Take 1 capsule by mouth Daily.        Pharmacy where request should be sent:      Last office visit with prescribing clinician: 9/11/2023   Last telemedicine visit with prescribing clinician: Visit date not found   Next office visit with prescribing clinician: 9/16/2024     Additional details provided by patient: HAS 7 DAYS LEFT    Does the patient have less than a 3 day supply:  [] Yes  [x] No    Would you like a call back once the refill request has been completed: [] Yes [] No    If the office needs to give you a call back, can they leave a voicemail: [] Yes [] No    Jen Wright Rep   01/08/24 15:24 CST

## 2024-09-16 ENCOUNTER — OFFICE VISIT (OUTPATIENT)
Dept: CARDIOLOGY | Facility: CLINIC | Age: 74
End: 2024-09-16
Payer: MEDICARE

## 2024-09-16 VITALS
HEIGHT: 66 IN | SYSTOLIC BLOOD PRESSURE: 118 MMHG | BODY MASS INDEX: 26.84 KG/M2 | HEART RATE: 44 BPM | DIASTOLIC BLOOD PRESSURE: 70 MMHG | WEIGHT: 167 LBS | OXYGEN SATURATION: 98 %

## 2024-09-16 DIAGNOSIS — E78.2 MIXED HYPERLIPIDEMIA: ICD-10-CM

## 2024-09-16 DIAGNOSIS — I47.19 ATRIAL TACHYCARDIA: ICD-10-CM

## 2024-09-16 DIAGNOSIS — I10 ESSENTIAL HYPERTENSION: ICD-10-CM

## 2024-09-16 DIAGNOSIS — R00.1 BRADYCARDIA, SINUS: Primary | ICD-10-CM

## 2024-09-16 PROCEDURE — 3078F DIAST BP <80 MM HG: CPT | Performed by: INTERNAL MEDICINE

## 2024-09-16 PROCEDURE — 99214 OFFICE O/P EST MOD 30 MIN: CPT | Performed by: INTERNAL MEDICINE

## 2024-09-16 PROCEDURE — 3074F SYST BP LT 130 MM HG: CPT | Performed by: INTERNAL MEDICINE

## 2024-09-16 PROCEDURE — 1159F MED LIST DOCD IN RCRD: CPT | Performed by: INTERNAL MEDICINE

## 2024-09-16 PROCEDURE — 1160F RVW MEDS BY RX/DR IN RCRD: CPT | Performed by: INTERNAL MEDICINE

## 2024-09-16 PROCEDURE — 93000 ELECTROCARDIOGRAM COMPLETE: CPT | Performed by: INTERNAL MEDICINE

## 2024-09-16 RX ORDER — FLECAINIDE ACETATE 100 MG/1
100 TABLET ORAL 2 TIMES DAILY
Qty: 180 TABLET | Refills: 3 | Status: SHIPPED | OUTPATIENT
Start: 2024-09-16

## 2024-09-16 RX ORDER — DILTIAZEM HYDROCHLORIDE 120 MG/1
120 CAPSULE, COATED, EXTENDED RELEASE ORAL DAILY
Qty: 90 CAPSULE | Refills: 3 | Status: SHIPPED | OUTPATIENT
Start: 2024-09-16

## 2025-01-26 ENCOUNTER — APPOINTMENT (OUTPATIENT)
Dept: CT IMAGING | Facility: HOSPITAL | Age: 75
End: 2025-01-26
Payer: COMMERCIAL

## 2025-01-26 ENCOUNTER — HOSPITAL ENCOUNTER (OUTPATIENT)
Facility: HOSPITAL | Age: 75
Setting detail: OBSERVATION
Discharge: HOME | End: 2025-01-28
Attending: FAMILY MEDICINE | Admitting: INTERNAL MEDICINE
Payer: MEDICARE

## 2025-01-26 ENCOUNTER — APPOINTMENT (OUTPATIENT)
Dept: CT IMAGING | Facility: HOSPITAL | Age: 75
End: 2025-01-26
Payer: MEDICARE

## 2025-01-26 DIAGNOSIS — R42 ORTHOSTATIC DIZZINESS: ICD-10-CM

## 2025-01-26 DIAGNOSIS — S33.5XXA LUMBAR SPRAIN, INITIAL ENCOUNTER: ICD-10-CM

## 2025-01-26 DIAGNOSIS — R55 SYNCOPE, UNSPECIFIED SYNCOPE TYPE: Primary | ICD-10-CM

## 2025-01-26 LAB
ALBUMIN SERPL-MCNC: 3.7 G/DL (ref 3.5–5.2)
ALBUMIN/GLOB SERPL: 1.3 G/DL
ALP SERPL-CCNC: 81 U/L (ref 39–117)
ALT SERPL W P-5'-P-CCNC: 23 U/L (ref 1–33)
ANION GAP SERPL CALCULATED.3IONS-SCNC: 8 MMOL/L (ref 5–15)
APTT PPP: 26.5 SECONDS (ref 24.5–36)
AST SERPL-CCNC: 31 U/L (ref 1–32)
BACTERIA UR QL AUTO: ABNORMAL /HPF
BASOPHILS # BLD AUTO: 0.02 10*3/MM3 (ref 0–0.2)
BASOPHILS NFR BLD AUTO: 0.5 % (ref 0–1.5)
BILIRUB SERPL-MCNC: 0.3 MG/DL (ref 0–1.2)
BILIRUB UR QL STRIP: NEGATIVE
BUN SERPL-MCNC: 17 MG/DL (ref 8–23)
BUN/CREAT SERPL: 13.4 (ref 7–25)
CALCIUM SPEC-SCNC: 7.8 MG/DL (ref 8.6–10.5)
CHLORIDE SERPL-SCNC: 104 MMOL/L (ref 98–107)
CLARITY UR: CLEAR
CO2 SERPL-SCNC: 24 MMOL/L (ref 22–29)
COLOR UR: YELLOW
CREAT SERPL-MCNC: 1.27 MG/DL (ref 0.57–1)
DEPRECATED RDW RBC AUTO: 45.1 FL (ref 37–54)
EGFRCR SERPLBLD CKD-EPI 2021: 44.7 ML/MIN/1.73
EOSINOPHIL # BLD AUTO: 0.03 10*3/MM3 (ref 0–0.4)
EOSINOPHIL NFR BLD AUTO: 0.7 % (ref 0.3–6.2)
ERYTHROCYTE [DISTWIDTH] IN BLOOD BY AUTOMATED COUNT: 13.2 % (ref 12.3–15.4)
GEN 5 1HR TROPONIN T REFLEX: 11 NG/L
GLOBULIN UR ELPH-MCNC: 2.8 GM/DL
GLUCOSE SERPL-MCNC: 100 MG/DL (ref 65–99)
GLUCOSE UR STRIP-MCNC: NEGATIVE MG/DL
HCT VFR BLD AUTO: 40.3 % (ref 34–46.6)
HGB BLD-MCNC: 12.8 G/DL (ref 12–15.9)
HGB UR QL STRIP.AUTO: ABNORMAL
HYALINE CASTS UR QL AUTO: ABNORMAL /LPF
IMM GRANULOCYTES # BLD AUTO: 0.04 10*3/MM3 (ref 0–0.05)
IMM GRANULOCYTES NFR BLD AUTO: 1 % (ref 0–0.5)
INR PPP: 0.98 (ref 0.91–1.09)
KETONES UR QL STRIP: NEGATIVE
LEUKOCYTE ESTERASE UR QL STRIP.AUTO: ABNORMAL
LIPASE SERPL-CCNC: 43 U/L (ref 13–60)
LYMPHOCYTES # BLD AUTO: 0.91 10*3/MM3 (ref 0.7–3.1)
LYMPHOCYTES NFR BLD AUTO: 21.8 % (ref 19.6–45.3)
MAGNESIUM SERPL-MCNC: 1.9 MG/DL (ref 1.6–2.4)
MCH RBC QN AUTO: 29.5 PG (ref 26.6–33)
MCHC RBC AUTO-ENTMCNC: 31.8 G/DL (ref 31.5–35.7)
MCV RBC AUTO: 92.9 FL (ref 79–97)
MONOCYTES # BLD AUTO: 0.41 10*3/MM3 (ref 0.1–0.9)
MONOCYTES NFR BLD AUTO: 9.8 % (ref 5–12)
NEUTROPHILS NFR BLD AUTO: 2.76 10*3/MM3 (ref 1.7–7)
NEUTROPHILS NFR BLD AUTO: 66.2 % (ref 42.7–76)
NITRITE UR QL STRIP: NEGATIVE
NRBC BLD AUTO-RTO: 0 /100 WBC (ref 0–0.2)
NT-PROBNP SERPL-MCNC: 473.1 PG/ML (ref 0–900)
PH UR STRIP.AUTO: 5.5 [PH] (ref 5–8)
PLATELET # BLD AUTO: 165 10*3/MM3 (ref 140–450)
PMV BLD AUTO: 9.6 FL (ref 6–12)
POTASSIUM SERPL-SCNC: 3.9 MMOL/L (ref 3.5–5.2)
PROT SERPL-MCNC: 6.5 G/DL (ref 6–8.5)
PROT UR QL STRIP: ABNORMAL
PROTHROMBIN TIME: 13.4 SECONDS (ref 11.8–14.8)
QT INTERVAL: 450 MS
QTC INTERVAL: 468 MS
RBC # BLD AUTO: 4.34 10*6/MM3 (ref 3.77–5.28)
RBC # UR STRIP: ABNORMAL /HPF
REF LAB TEST METHOD: ABNORMAL
SODIUM SERPL-SCNC: 136 MMOL/L (ref 136–145)
SP GR UR STRIP: 1.01 (ref 1–1.03)
SQUAMOUS #/AREA URNS HPF: ABNORMAL /HPF
TROPONIN T NUMERIC DELTA: -1 NG/L
TROPONIN T SERPL HS-MCNC: 12 NG/L
UROBILINOGEN UR QL STRIP: ABNORMAL
WBC # UR STRIP: ABNORMAL /HPF
WBC NRBC COR # BLD AUTO: 4.17 10*3/MM3 (ref 3.4–10.8)

## 2025-01-26 PROCEDURE — 72131 CT LUMBAR SPINE W/O DYE: CPT

## 2025-01-26 PROCEDURE — 25810000003 LACTATED RINGERS SOLUTION: Performed by: FAMILY MEDICINE

## 2025-01-26 PROCEDURE — 70450 CT HEAD/BRAIN W/O DYE: CPT

## 2025-01-26 PROCEDURE — 83690 ASSAY OF LIPASE: CPT | Performed by: FAMILY MEDICINE

## 2025-01-26 PROCEDURE — 72192 CT PELVIS W/O DYE: CPT

## 2025-01-26 PROCEDURE — 25810000003 SODIUM CHLORIDE 0.9 % SOLUTION: Performed by: INTERNAL MEDICINE

## 2025-01-26 PROCEDURE — 93005 ELECTROCARDIOGRAM TRACING: CPT | Performed by: FAMILY MEDICINE

## 2025-01-26 PROCEDURE — G0378 HOSPITAL OBSERVATION PER HR: HCPCS

## 2025-01-26 PROCEDURE — 85025 COMPLETE CBC W/AUTO DIFF WBC: CPT | Performed by: FAMILY MEDICINE

## 2025-01-26 PROCEDURE — 85730 THROMBOPLASTIN TIME PARTIAL: CPT | Performed by: FAMILY MEDICINE

## 2025-01-26 PROCEDURE — 84484 ASSAY OF TROPONIN QUANT: CPT | Performed by: FAMILY MEDICINE

## 2025-01-26 PROCEDURE — 83735 ASSAY OF MAGNESIUM: CPT | Performed by: FAMILY MEDICINE

## 2025-01-26 PROCEDURE — 87086 URINE CULTURE/COLONY COUNT: CPT | Performed by: FAMILY MEDICINE

## 2025-01-26 PROCEDURE — 25010000002 ENOXAPARIN PER 10 MG: Performed by: INTERNAL MEDICINE

## 2025-01-26 PROCEDURE — 85610 PROTHROMBIN TIME: CPT | Performed by: FAMILY MEDICINE

## 2025-01-26 PROCEDURE — 81001 URINALYSIS AUTO W/SCOPE: CPT | Performed by: FAMILY MEDICINE

## 2025-01-26 PROCEDURE — 96372 THER/PROPH/DIAG INJ SC/IM: CPT

## 2025-01-26 PROCEDURE — 83880 ASSAY OF NATRIURETIC PEPTIDE: CPT | Performed by: FAMILY MEDICINE

## 2025-01-26 PROCEDURE — 80053 COMPREHEN METABOLIC PANEL: CPT | Performed by: FAMILY MEDICINE

## 2025-01-26 PROCEDURE — 36415 COLL VENOUS BLD VENIPUNCTURE: CPT

## 2025-01-26 PROCEDURE — 99285 EMERGENCY DEPT VISIT HI MDM: CPT

## 2025-01-26 RX ORDER — ONDANSETRON 2 MG/ML
4 INJECTION INTRAMUSCULAR; INTRAVENOUS EVERY 6 HOURS PRN
Status: DISCONTINUED | OUTPATIENT
Start: 2025-01-26 | End: 2025-01-28 | Stop reason: HOSPADM

## 2025-01-26 RX ORDER — SODIUM CHLORIDE 0.9 % (FLUSH) 0.9 %
10 SYRINGE (ML) INJECTION AS NEEDED
Status: DISCONTINUED | OUTPATIENT
Start: 2025-01-26 | End: 2025-01-27

## 2025-01-26 RX ORDER — ACETAMINOPHEN 160 MG/5ML
650 SOLUTION ORAL EVERY 4 HOURS PRN
Status: DISCONTINUED | OUTPATIENT
Start: 2025-01-26 | End: 2025-01-28 | Stop reason: HOSPADM

## 2025-01-26 RX ORDER — ACETAMINOPHEN 325 MG/1
650 TABLET ORAL EVERY 4 HOURS PRN
Status: DISCONTINUED | OUTPATIENT
Start: 2025-01-26 | End: 2025-01-28 | Stop reason: HOSPADM

## 2025-01-26 RX ORDER — SODIUM CHLORIDE 0.9 % (FLUSH) 0.9 %
10 SYRINGE (ML) INJECTION EVERY 12 HOURS SCHEDULED
Status: DISCONTINUED | OUTPATIENT
Start: 2025-01-26 | End: 2025-01-28 | Stop reason: HOSPADM

## 2025-01-26 RX ORDER — ATORVASTATIN CALCIUM 40 MG/1
40 TABLET, FILM COATED ORAL NIGHTLY
Status: DISCONTINUED | OUTPATIENT
Start: 2025-01-26 | End: 2025-01-28 | Stop reason: HOSPADM

## 2025-01-26 RX ORDER — SODIUM CHLORIDE 9 MG/ML
40 INJECTION, SOLUTION INTRAVENOUS AS NEEDED
Status: DISCONTINUED | OUTPATIENT
Start: 2025-01-26 | End: 2025-01-28 | Stop reason: HOSPADM

## 2025-01-26 RX ORDER — ACETAMINOPHEN 650 MG/1
650 SUPPOSITORY RECTAL EVERY 4 HOURS PRN
Status: DISCONTINUED | OUTPATIENT
Start: 2025-01-26 | End: 2025-01-28 | Stop reason: HOSPADM

## 2025-01-26 RX ORDER — SODIUM CHLORIDE 0.9 % (FLUSH) 0.9 %
10 SYRINGE (ML) INJECTION AS NEEDED
Status: DISCONTINUED | OUTPATIENT
Start: 2025-01-26 | End: 2025-01-28 | Stop reason: HOSPADM

## 2025-01-26 RX ORDER — FLECAINIDE ACETATE 100 MG/1
100 TABLET ORAL 2 TIMES DAILY
Status: DISCONTINUED | OUTPATIENT
Start: 2025-01-26 | End: 2025-01-28 | Stop reason: HOSPADM

## 2025-01-26 RX ORDER — DILTIAZEM HYDROCHLORIDE 120 MG/1
120 CAPSULE, COATED, EXTENDED RELEASE ORAL DAILY
Status: DISCONTINUED | OUTPATIENT
Start: 2025-01-26 | End: 2025-01-28 | Stop reason: HOSPADM

## 2025-01-26 RX ORDER — ENOXAPARIN SODIUM 100 MG/ML
40 INJECTION SUBCUTANEOUS EVERY 24 HOURS
Status: DISCONTINUED | OUTPATIENT
Start: 2025-01-26 | End: 2025-01-28 | Stop reason: HOSPADM

## 2025-01-26 RX ORDER — SODIUM CHLORIDE, SODIUM LACTATE, POTASSIUM CHLORIDE, CALCIUM CHLORIDE 600; 310; 30; 20 MG/100ML; MG/100ML; MG/100ML; MG/100ML
100 INJECTION, SOLUTION INTRAVENOUS CONTINUOUS
Status: DISCONTINUED | OUTPATIENT
Start: 2025-01-26 | End: 2025-01-26

## 2025-01-26 RX ORDER — SODIUM CHLORIDE 9 MG/ML
125 INJECTION, SOLUTION INTRAVENOUS CONTINUOUS
Status: DISCONTINUED | OUTPATIENT
Start: 2025-01-26 | End: 2025-01-27

## 2025-01-26 RX ADMIN — ATORVASTATIN CALCIUM 40 MG: 40 TABLET, FILM COATED ORAL at 20:19

## 2025-01-26 RX ADMIN — SODIUM CHLORIDE 125 ML/HR: 9 INJECTION, SOLUTION INTRAVENOUS at 17:56

## 2025-01-26 RX ADMIN — SODIUM CHLORIDE, POTASSIUM CHLORIDE, SODIUM LACTATE AND CALCIUM CHLORIDE 500 ML: 600; 310; 30; 20 INJECTION, SOLUTION INTRAVENOUS at 14:53

## 2025-01-26 RX ADMIN — ENOXAPARIN SODIUM 40 MG: 100 INJECTION SUBCUTANEOUS at 17:51

## 2025-01-26 RX ADMIN — Medication 10 ML: at 20:19

## 2025-01-26 RX ADMIN — FLECAINIDE ACETATE 100 MG: 100 TABLET ORAL at 20:19

## 2025-01-26 NOTE — ED NOTES
Nursing report ED to floor  Reshma Crouch  74 y.o.  female    HPI:   Chief Complaint   Patient presents with    Nausea    Vomiting    Diarrhea    Syncope       Admitting doctor:   Davey Xavier MD    Consulting provider(s):  Consults       No orders found from 12/28/2024 to 1/27/2025.             Admitting diagnosis:   The primary encounter diagnosis was Syncope, unspecified syncope type. Diagnoses of Lumbar sprain, initial encounter and Orthostatic dizziness were also pertinent to this visit.    Code status:   Current Code Status       Date Active Code Status Order ID Comments User Context       Not on file            Allergies:   Novocain [procaine]    Intake and Output  No intake or output data in the 24 hours ending 01/26/25 1655    Weight:       01/26/25  1242   Weight: 75 kg (165 lb 5.5 oz)       Most recent vitals:   Vitals:    01/26/25 1316 01/26/25 1515 01/26/25 1518 01/26/25 1521   BP: 129/76 128/84 134/78 124/78   BP Location:       Patient Position:  Lying Sitting Standing   Pulse: 68 56 56 56   Resp:       Temp:       TempSrc:       SpO2: 94% 96% 98% 96%   Weight:       Height:         Oxygen Therapy: .    Active LDAs/IV Access:   Lines, Drains & Airways       Active LDAs       Name Placement date Placement time Site Days    Peripheral IV 01/26/25 1304 Left Antecubital 01/26/25  1304  Antecubital  less than 1                    Labs (abnormal labs have a star):   Labs Reviewed   COMPREHENSIVE METABOLIC PANEL - Abnormal; Notable for the following components:       Result Value    Glucose 100 (*)     Creatinine 1.27 (*)     Calcium 7.8 (*)     eGFR 44.7 (*)     All other components within normal limits    Narrative:     GFR Categories in Chronic Kidney Disease (CKD)      GFR Category          GFR (mL/min/1.73)    Interpretation  G1                     90 or greater         Normal or high (1)  G2                      60-89                Mild decrease (1)  G3a                   45-59                 Mild to moderate decrease  G3b                   30-44                Moderate to severe decrease  G4                    15-29                Severe decrease  G5                    14 or less           Kidney failure          (1)In the absence of evidence of kidney disease, neither GFR category G1 or G2 fulfill the criteria for CKD.    eGFR calculation 2021 CKD-EPI creatinine equation, which does not include race as a factor   URINALYSIS W/ CULTURE IF INDICATED - Abnormal; Notable for the following components:    Blood, UA Moderate (2+) (*)     Protein, UA Trace (*)     Leuk Esterase, UA Small (1+) (*)     All other components within normal limits    Narrative:     In absence of clinical symptoms, the presence of pyuria, bacteria, and/or nitrites on the urinalysis result does not correlate with infection.   CBC WITH AUTO DIFFERENTIAL - Abnormal; Notable for the following components:    Immature Grans % 1.0 (*)     All other components within normal limits   URINALYSIS, MICROSCOPIC ONLY - Abnormal; Notable for the following components:    RBC, UA 3-5 (*)     WBC, UA 6-10 (*)     Bacteria, UA Trace (*)     All other components within normal limits   PROTIME-INR - Normal   APTT - Normal   LIPASE - Normal   MAGNESIUM - Normal   TROPONIN - Normal    Narrative:     High Sensitive Troponin T Reference Range:  <14.0 ng/L- Negative Female for AMI  <22.0 ng/L- Negative Male for AMI  >=14 - Abnormal Female indicating possible myocardial injury.  >=22 - Abnormal Male indicating possible myocardial injury.   Clinicians would have to utilize clinical acumen, EKG, Troponin, and serial changes to determine if it is an Acute Myocardial Infarction or myocardial injury due to an underlying chronic condition.        BNP (IN-HOUSE) - Normal    Narrative:     This assay is used as an aid in the diagnosis of individuals suspected of having heart failure. It can be used as an aid in the diagnosis of acute decompensated heart failure (ADHF)  in patients presenting with signs and symptoms of ADHF to the emergency department (ED). In addition, NT-proBNP of <300 pg/mL indicates ADHF is not likely.    Age Range Result Interpretation  NT-proBNP Concentration (pg/mL:      <50             Positive            >450                   Gray                 300-450                    Negative             <300    50-75           Positive            >900                  Gray                300-900                  Negative            <300      >75             Positive            >1800                  Gray                300-1800                  Negative            <300   HIGH SENSITIVITIY TROPONIN T 1HR - Normal    Narrative:     High Sensitive Troponin T Reference Range:  <14.0 ng/L- Negative Female for AMI  <22.0 ng/L- Negative Male for AMI  >=14 - Abnormal Female indicating possible myocardial injury.  >=22 - Abnormal Male indicating possible myocardial injury.   Clinicians would have to utilize clinical acumen, EKG, Troponin, and serial changes to determine if it is an Acute Myocardial Infarction or myocardial injury due to an underlying chronic condition.        URINE CULTURE   CBC AND DIFFERENTIAL    Narrative:     The following orders were created for panel order CBC & Differential.  Procedure                               Abnormality         Status                     ---------                               -----------         ------                     CBC Auto Differential[[id number]]        Abnormal            Final result                 Please view results for these tests on the individual orders.       Meds given in ED:   Medications   sodium chloride 0.9 % flush 10 mL (has no administration in time range)   sodium chloride 0.9 % infusion (has no administration in time range)   lactated ringers bolus 500 mL (500 mL Intravenous New Bag 1/26/25 1453)     sodium chloride, 125 mL/hr         NIH Stroke Scale:       Isolation/Infection(s):  No active  isolations   No active infections     COVID Testing  Collected .  Resulted .    Nursing report ED to floor:  Mental status: .A&ox4  Ambulatory status: .[id number]  Precautions: .none    ED nurse phone extentsion- ..

## 2025-01-26 NOTE — ED PROVIDER NOTES
HPI:     Patient is a 74-year-old white female presents to the emergency room status post syncopal episode.  And dizziness with nausea vomiting and diarrhea.  Diarrhea is nonbloody has been going on for the last couple days.  The  was at the bedside states he heard a sound like someone fell and went into check on her and she was laying in the floor.  She seemed to be mildly confused but was arousable.  She states that she sees cardiology but not sure what is wrong with her heart.  She was given Zofran and route which resolved the patient's nausea.  Patient states she is having low back pain and left hip pain.  She denies any loss of bowel or bladder control after the fall and denies pelvic numbness    REVIEW OF SYSTEMS    CONSTITUTIONAL:  No complaints of fever, chills,or weakness  EYES:  No complaints of discharge   ENT: No complaints of sore throat or ear pain  CARDIOVASCULAR:  No complaints of chest pain, palpitations, or swelling  RESPIRATORY:  No complaints of cough or shortness of breath  GI: Positive for nausea vomiting diarrhea nonbloody   MUSCULOSKELETAL: Positive for fall with low back left hip pain   SKIN:  No complaints of rash  NEUROLOGIC: Positive for dizziness and syncopal episode   ENDOCRINE:  No complaints of polyuria or polydipsia  LYMPHATIC:  No complaints of swollen glands  GENITOURINARY: No complaints of urinary frequency or hematuria      PAST MEDICAL HISTORY  Past Medical History:   Diagnosis Date    Atrial tachycardia     AV block     Hospital discharge follow-up     Hyperlipidemia     ANTONIO (iron deficiency anemia)     Paroxysmal SVT (supraventricular tachycardia)        FAMILY HISTORY  Family History   Problem Relation Age of Onset    Cirrhosis Mother         NON ALCOHOLIC CIRRHOSIS    Other Father         NEPHRITIS/KIDNEY TRANSPLANT    Breast cancer Neg Hx     Colon cancer Neg Hx     Esophageal cancer Neg Hx        SOCIAL HISTORY  Social History     Socioeconomic History    Marital  "status: Single   Tobacco Use    Smoking status: Never    Smokeless tobacco: Never   Vaping Use    Vaping status: Never Used   Substance and Sexual Activity    Alcohol use: No    Drug use: No    Sexual activity: Defer       IMMUNIZATION HISTORY  Deferred to primary care physician.    SURGICAL HISTORY  Past Surgical History:   Procedure Laterality Date     SECTION      CHOLECYSTECTOMY      COLONOSCOPY N/A 2017    Procedure: COLONOSCOPY WITH ANESTHESIA;  Surgeon: Clifford Huynh DO;  Location: [facility] ENDOSCOPY;  Service:     EYE SURGERY Right 2022    NEPHRECTOMY Left 2017    DR. MORALES IN [place]     TONSILLECTOMY         CURRENT MEDICATIONS    Current Facility-Administered Medications:     [COMPLETED] Insert Peripheral IV, , , Once **AND** sodium chloride 0.9 % flush 10 mL, 10 mL, Intravenous, PRN, Angel Cullen Jr., MD    sodium chloride 0.9 % infusion, 125 mL/hr, Intravenous, Continuous, Angel Cullen Jr., MD    Current Outpatient Medications:     atorvastatin (LIPITOR) 40 MG tablet, Take 1 tablet by mouth Every Night., Disp: , Rfl:     dilTIAZem CD (CARDIZEM CD) 120 MG 24 hr capsule, Take 1 capsule by mouth Daily., Disp: 90 capsule, Rfl: 3    flecainide (TAMBOCOR) 100 MG tablet, Take 1 tablet by mouth 2 (Two) Times a Day., Disp: 180 tablet, Rfl: 3    ALLERGIES  Allergies   Allergen Reactions    Novocain [Procaine] Other (See Comments)     LOCAL ANESTHETICS-Parenteral, almost passed out         ABDOMINAL EXAM    VITAL SIGNS:   /67 (BP Location: Right arm, Patient Position: Lying)   Pulse 72   Temp 97.8 °F (36.6 °C) (Oral)   Resp 16   Ht 167.6 cm (66\")   Wt 75 kg (165 lb 5.5 oz)   SpO2 90%   BMI 26.69 kg/m²     Constitutional: Patient is alert and in no distress.  Patient with moderate abdominal and mild head and low back and left hip discomfort.    Eyes: No nystagmus.  EOMI PERRL intact    ENT: There is a normal pharynx with no acute erythema or exudate and oral " mucosa is moist.  Nose is clear with no drainage.  Tympanic membranes intact and non-erythemic    Cardiovascular: S1-S2 regular rate and rhythm. No murmurs, rubs or gallops.  Pulses are equal bilaterally and there is no pitting edema.    Respiratory: Patient is clear to auscultation bilaterally with no wheezing or rhonchi.  Chest wall is nontender.  There are no external lesions on the chest.  There is no crepitance.    Gastrointestinal: Bowel sounds are normal all 4 quadrants.  There is mild tenderness fusilli but no rebound or guarding.  There is no abdominal distention or fluid wave.    Neurological: CN's 2-12 grossly intact no focal deficit strength 5+ in bilateral upper and lower extremity facial trunk    Musculoskeletal: Tenderness to palpation of the lower lumbar spine with no obvious step-off.  There is also tenderness over the left iliac crest and lateral hip.  There is no leg length discrepancy.  The sacrum also has tenderness but no obvious step-off.        Genitourinary: Patient is voiding appropriately.    Integument: No acute lesions noted.  Color appears to be normal.    [place] Coma Scale: Total score 15    Neurological: Patient is alert and oriented x4 and no acute findings noted.  Speech is fluent and cognition is normal.  No evidence of acute CVA.  Cranial nerves II through XII intact.  Patient with normal motor function as well as reflexes and sensation    Psychiatric: Normal affect and mood.            RADIOLOGY/PROCEDURES        CT Lumbar Spine Without Contrast   Final Result   Age-indeterminate compression deformity involving the inferior endplate   of T12 is new compared to the 5/8/2017 study. Subacute or chronic   finding is favored, however correlation for regional tenderness   recommended. No acute lumbar vertebral fracture or traumatic   malalignment. Degenerative disc changes greatest at L5-S1. Mild central   canal stenosis at L3/4 with mild to moderate right L4/5 foraminal   narrowing.  Please refer to dictation above for detailed findings at each   level.       This report was signed and finalized on 1/26/2025 2:23 PM by Dr. Juani Orozco MD.          CT Pelvis Without Contrast   Final Result   No acute bony injury of the pelvis or hips. Arthritic changes as   described above.       This report was signed and finalized on 1/26/2025 2:18 PM by Dr. Juani Orozco MD.          CT Head Without Contrast   Final Result       1. No acute intracranial abnormality.   2. Chronic sinusitis.       This report was signed and finalized on 1/26/2025 1:31 PM by Dr. Juani Orozco MD.                 FUTURE APPOINTMENTS     Future Appointments   Date Time Provider Department Center   9/22/2025  8:15 AM Tee Lentz MD [facility]            COURSE & MEDICAL DECISION MAKING       Patient's partial differential diagnosis can include:    arrhythmia, seizure, electrolyte abnormality, vasovagal syncope, hip contusion, hip fracture, lumbar sprain, lumbar fracture gastritis, gastroenteritis, colitis, enteritis, volvulus, intussusception, esophageal spasms, gastroparesis, GERD, peptic ulcer disease, partial bowel obstruction, bowel obstruction,, AAA, mesenteric adenitis, mesenteric ischemia, constipation, irritable bowel, diverticulitis, diverticulosis, Crohn's disease, ulcerative colitis, celiac disease and others    Patient is still dizzy despite getting 1.5 L of fluid bolus.  Troponin negative x 2 I do feel that much of her symptoms are due to orthostatic dizziness however due to the fact that it was unwitnessed syncopal episode with following confusion I do feel that the patient would be better served to be admitted to the hospital fluid rehydrated and even consider EEG to make sure it was not a seizure.    Spoke with patient's PCP Dr. Xavier who will admit the patient to his medical service    Patient's level of risk: Moderate       CRITICAL CARE    CRITICAL CARE: No    CRITICAL CARE TIME:  None      Recent Results (from the past 24 hours)   ECG 12 Lead Syncope    Collection Time: 01/26/25  1:02 PM   Result Value Ref Range    QT Interval 450 ms    QTC Interval 468 ms   Comprehensive Metabolic Panel    Collection Time: 01/26/25  1:15 PM    Specimen: Blood   Result Value Ref Range    Glucose 100 (H) 65 - 99 mg/dL    BUN 17 8 - 23 mg/dL    Creatinine 1.27 (H) 0.57 - 1.00 mg/dL    Sodium 136 136 - 145 mmol/L    Potassium 3.9 3.5 - 5.2 mmol/L    Chloride 104 98 - 107 mmol/L    CO2 24.0 22.0 - 29.0 mmol/L    Calcium 7.8 (L) 8.6 - 10.5 mg/dL    Total Protein 6.5 6.0 - 8.5 g/dL    Albumin 3.7 3.5 - 5.2 g/dL    ALT (SGPT) 23 1 - 33 U/L    AST (SGOT) 31 1 - 32 U/L    Alkaline Phosphatase 81 39 - 117 U/L    Total Bilirubin 0.3 0.0 - 1.2 mg/dL    Globulin 2.8 gm/dL    A/G Ratio 1.3 g/dL    BUN/Creatinine Ratio 13.4 7.0 - 25.0    Anion Gap 8.0 5.0 - 15.0 mmol/L    eGFR 44.7 (L) >60.0 mL/min/1.73   Protime-INR    Collection Time: 01/26/25  1:15 PM    Specimen: Blood   Result Value Ref Range    Protime 13.4 11.8 - 14.8 Seconds    INR 0.98 0.91 - 1.09   aPTT    Collection Time: 01/26/25  1:15 PM    Specimen: Blood   Result Value Ref Range    PTT 26.5 24.5 - 36.0 seconds   Lipase    Collection Time: 01/26/25  1:15 PM    Specimen: Blood   Result Value Ref Range    Lipase 43 13 - 60 U/L   Magnesium    Collection Time: 01/26/25  1:15 PM    Specimen: Blood   Result Value Ref Range    Magnesium 1.9 1.6 - 2.4 mg/dL   CBC Auto Differential    Collection Time: 01/26/25  1:15 PM    Specimen: Blood   Result Value Ref Range    WBC 4.17 3.40 - 10.80 10*3/mm3    RBC 4.34 3.77 - 5.28 10*6/mm3    Hemoglobin 12.8 12.0 - 15.9 g/dL    Hematocrit 40.3 34.0 - 46.6 %    MCV 92.9 79.0 - 97.0 fL    MCH 29.5 26.6 - 33.0 pg    MCHC 31.8 31.5 - 35.7 g/dL    RDW 13.2 12.3 - 15.4 %    RDW-SD 45.1 37.0 - 54.0 fl    MPV 9.6 6.0 - 12.0 fL    Platelets 165 140 - 450 10*3/mm3    Neutrophil % 66.2 42.7 - 76.0 %    Lymphocyte % 21.8 19.6 - 45.3 %     Monocyte % 9.8 5.0 - 12.0 %    Eosinophil % 0.7 0.3 - 6.2 %    Basophil % 0.5 0.0 - 1.5 %    Immature Grans % 1.0 (H) 0.0 - 0.5 %    Neutrophils, Absolute 2.76 1.70 - 7.00 10*3/mm3    Lymphocytes, Absolute 0.91 0.70 - 3.10 10*3/mm3    Monocytes, Absolute 0.41 0.10 - 0.90 10*3/mm3    Eosinophils, Absolute 0.03 0.00 - 0.40 10*3/mm3    Basophils, Absolute 0.02 0.00 - 0.20 10*3/mm3    Immature Grans, Absolute 0.04 0.00 - 0.05 10*3/mm3    nRBC 0.0 0.0 - 0.2 /100 WBC   High Sensitivity Troponin T    Collection Time: 01/26/25  1:15 PM    Specimen: Blood   Result Value Ref Range    HS Troponin T 12 <14 ng/L   BNP    Collection Time: 01/26/25  1:15 PM    Specimen: Blood   Result Value Ref Range    proBNP 473.1 0.0 - 900.0 pg/mL   High Sensitivity Troponin T 1Hr    Collection Time: 01/26/25  2:56 PM    Specimen: Arm, Left; Blood   Result Value Ref Range    HS Troponin T 11 <14 ng/L    Troponin T Numeric Delta -1 Abnormal if >/=3 ng/L          Old charts were reviewed per [facility] EMR.  Pertinent details are summarized above.  All laboratory, radiologic, and EKG studies that were performed in the Emergency Department were a necessary part of the evaluation needed to exclude unstable or  emergent medical conditions.     Patient was hemodynamically and neurologically stable in the ED.   Pertinent studies were reviewed as above.     The patient received:  Medications   sodium chloride 0.9 % flush 10 mL (has no administration in time range)   sodium chloride 0.9 % infusion (has no administration in time range)   lactated ringers bolus 500 mL (500 mL Intravenous New Bag 1/26/25 5840)            Diagnoses that have been ruled out:   None   Diagnoses that are still under consideration:   None   Final diagnoses:   Syncope, unspecified syncope type   Lumbar sprain, initial encounter   Orthostatic dizziness        FINAL IMPRESSION   Diagnosis Plan   1. Syncope, unspecified syncope type        2. Lumbar sprain, initial encounter        3.  Orthostatic dizziness              Angel Cullen Jr, MD        ED Disposition       ED Disposition   Decision to Admit    Condition   --    Comment   Level of Care: Telemetry [5]   Diagnosis: Syncope [[id number]]   Admitting Physician: JENNIFER HALL [[id number]]   Attending Physician: JENNIFER HALL [[id number]]                   Dragon disclaimer:  Part of this note may be an electronic transcription/translation of spoken language to printed text using the Dragon Dictation System.     I have reviewed the patient’s prescription history via a prescription monitoring program.  This information is consistent with my knowledge of the patient’s controlled substance use history.        Angel Cullen Jr., MD  01/26/25 1989

## 2025-01-27 ENCOUNTER — APPOINTMENT (OUTPATIENT)
Dept: CARDIOLOGY | Facility: HOSPITAL | Age: 75
End: 2025-01-27
Payer: MEDICARE

## 2025-01-27 ENCOUNTER — APPOINTMENT (OUTPATIENT)
Dept: GENERAL RADIOLOGY | Facility: HOSPITAL | Age: 75
End: 2025-01-27
Payer: MEDICARE

## 2025-01-27 LAB
25(OH)D3 SERPL-MCNC: 6.8 NG/ML (ref 30–100)
ALBUMIN SERPL-MCNC: 3.1 G/DL (ref 3.5–5.2)
ALBUMIN/GLOB SERPL: 1.6 G/DL
ALP SERPL-CCNC: 66 U/L (ref 39–117)
ALT SERPL W P-5'-P-CCNC: 15 U/L (ref 1–33)
ANION GAP SERPL CALCULATED.3IONS-SCNC: 10 MMOL/L (ref 5–15)
AST SERPL-CCNC: 24 U/L (ref 1–32)
B PARAPERT DNA SPEC QL NAA+PROBE: NOT DETECTED
B PERT DNA SPEC QL NAA+PROBE: NOT DETECTED
BACTERIA SPEC AEROBE CULT: NORMAL
BASOPHILS # BLD AUTO: 0.02 10*3/MM3 (ref 0–0.2)
BASOPHILS NFR BLD AUTO: 0.6 % (ref 0–1.5)
BILIRUB SERPL-MCNC: 0.3 MG/DL (ref 0–1.2)
BUN SERPL-MCNC: 16 MG/DL (ref 8–23)
BUN/CREAT SERPL: 16.8 (ref 7–25)
C PNEUM DNA NPH QL NAA+NON-PROBE: NOT DETECTED
CA-I BLD-MCNC: 3.89 MG/DL (ref 4.6–5.4)
CALCIUM SPEC-SCNC: 7.2 MG/DL (ref 8.6–10.5)
CHLORIDE SERPL-SCNC: 108 MMOL/L (ref 98–107)
CO2 SERPL-SCNC: 21 MMOL/L (ref 22–29)
CREAT SERPL-MCNC: 0.95 MG/DL (ref 0.57–1)
DEPRECATED RDW RBC AUTO: 45.8 FL (ref 37–54)
EGFRCR SERPLBLD CKD-EPI 2021: 63.4 ML/MIN/1.73
EOSINOPHIL # BLD AUTO: 0.07 10*3/MM3 (ref 0–0.4)
EOSINOPHIL NFR BLD AUTO: 2.1 % (ref 0.3–6.2)
ERYTHROCYTE [DISTWIDTH] IN BLOOD BY AUTOMATED COUNT: 13.3 % (ref 12.3–15.4)
FLUAV H1 2009 PAND RNA NPH QL NAA+PROBE: DETECTED
FLUBV RNA ISLT QL NAA+PROBE: NOT DETECTED
GLOBULIN UR ELPH-MCNC: 2 GM/DL
GLUCOSE SERPL-MCNC: 86 MG/DL (ref 65–99)
HADV DNA SPEC NAA+PROBE: NOT DETECTED
HCOV 229E RNA SPEC QL NAA+PROBE: NOT DETECTED
HCOV HKU1 RNA SPEC QL NAA+PROBE: NOT DETECTED
HCOV NL63 RNA SPEC QL NAA+PROBE: NOT DETECTED
HCOV OC43 RNA SPEC QL NAA+PROBE: NOT DETECTED
HCT VFR BLD AUTO: 36.5 % (ref 34–46.6)
HGB BLD-MCNC: 11.5 G/DL (ref 12–15.9)
HMPV RNA NPH QL NAA+NON-PROBE: NOT DETECTED
HPIV1 RNA ISLT QL NAA+PROBE: NOT DETECTED
HPIV2 RNA SPEC QL NAA+PROBE: NOT DETECTED
HPIV3 RNA NPH QL NAA+PROBE: NOT DETECTED
HPIV4 P GENE NPH QL NAA+PROBE: NOT DETECTED
IMM GRANULOCYTES # BLD AUTO: 0.02 10*3/MM3 (ref 0–0.05)
IMM GRANULOCYTES NFR BLD AUTO: 0.6 % (ref 0–0.5)
LYMPHOCYTES # BLD AUTO: 1.46 10*3/MM3 (ref 0.7–3.1)
LYMPHOCYTES NFR BLD AUTO: 44.4 % (ref 19.6–45.3)
Lab: ABNORMAL
M PNEUMO IGG SER IA-ACNC: NOT DETECTED
MCH RBC QN AUTO: 29.2 PG (ref 26.6–33)
MCHC RBC AUTO-ENTMCNC: 31.5 G/DL (ref 31.5–35.7)
MCV RBC AUTO: 92.6 FL (ref 79–97)
MONOCYTES # BLD AUTO: 0.47 10*3/MM3 (ref 0.1–0.9)
MONOCYTES NFR BLD AUTO: 14.3 % (ref 5–12)
NEUTROPHILS NFR BLD AUTO: 1.25 10*3/MM3 (ref 1.7–7)
NEUTROPHILS NFR BLD AUTO: 38 % (ref 42.7–76)
NRBC BLD AUTO-RTO: 0 /100 WBC (ref 0–0.2)
PLATELET # BLD AUTO: 157 10*3/MM3 (ref 140–450)
PMV BLD AUTO: 9.8 FL (ref 6–12)
POTASSIUM SERPL-SCNC: 3.7 MMOL/L (ref 3.5–5.2)
PROT SERPL-MCNC: 5.1 G/DL (ref 6–8.5)
PTH-INTACT SERPL-MCNC: 66.2 PG/ML (ref 15–65)
RBC # BLD AUTO: 3.94 10*6/MM3 (ref 3.77–5.28)
RHINOVIRUS RNA SPEC NAA+PROBE: NOT DETECTED
RSV RNA NPH QL NAA+NON-PROBE: NOT DETECTED
SARS-COV-2 RNA RESP QL NAA+PROBE: NOT DETECTED
SODIUM SERPL-SCNC: 139 MMOL/L (ref 136–145)
WBC NRBC COR # BLD AUTO: 3.29 10*3/MM3 (ref 3.4–10.8)

## 2025-01-27 PROCEDURE — 93356 MYOCRD STRAIN IMG SPCKL TRCK: CPT

## 2025-01-27 PROCEDURE — 82330 ASSAY OF CALCIUM: CPT

## 2025-01-27 PROCEDURE — 96361 HYDRATE IV INFUSION ADD-ON: CPT

## 2025-01-27 PROCEDURE — G0378 HOSPITAL OBSERVATION PER HR: HCPCS

## 2025-01-27 PROCEDURE — 25010000002 CEFTRIAXONE PER 250 MG

## 2025-01-27 PROCEDURE — 93306 TTE W/DOPPLER COMPLETE: CPT

## 2025-01-27 PROCEDURE — 25010000002 ENOXAPARIN PER 10 MG: Performed by: INTERNAL MEDICINE

## 2025-01-27 PROCEDURE — 85025 COMPLETE CBC W/AUTO DIFF WBC: CPT | Performed by: INTERNAL MEDICINE

## 2025-01-27 PROCEDURE — 0202U NFCT DS 22 TRGT SARS-COV-2: CPT

## 2025-01-27 PROCEDURE — 83970 ASSAY OF PARATHORMONE: CPT | Performed by: INTERNAL MEDICINE

## 2025-01-27 PROCEDURE — 96372 THER/PROPH/DIAG INJ SC/IM: CPT

## 2025-01-27 PROCEDURE — 71046 X-RAY EXAM CHEST 2 VIEWS: CPT

## 2025-01-27 PROCEDURE — 25810000003 SODIUM CHLORIDE 0.9 % SOLUTION

## 2025-01-27 PROCEDURE — 25010000002 CALCIUM GLUCONATE-NACL 1-0.675 GM/50ML-% SOLUTION: Performed by: INTERNAL MEDICINE

## 2025-01-27 PROCEDURE — 80053 COMPREHEN METABOLIC PANEL: CPT | Performed by: INTERNAL MEDICINE

## 2025-01-27 PROCEDURE — 96365 THER/PROPH/DIAG IV INF INIT: CPT

## 2025-01-27 PROCEDURE — 82306 VITAMIN D 25 HYDROXY: CPT | Performed by: INTERNAL MEDICINE

## 2025-01-27 RX ORDER — CALCIUM GLUCONATE 20 MG/ML
1000 INJECTION, SOLUTION INTRAVENOUS ONCE
Status: DISCONTINUED | OUTPATIENT
Start: 2025-01-27 | End: 2025-01-27

## 2025-01-27 RX ORDER — CALCIUM GLUCONATE 20 MG/ML
1000 INJECTION, SOLUTION INTRAVENOUS ONCE
Status: COMPLETED | OUTPATIENT
Start: 2025-01-27 | End: 2025-01-27

## 2025-01-27 RX ORDER — SODIUM CHLORIDE 9 MG/ML
125 INJECTION, SOLUTION INTRAVENOUS CONTINUOUS
Status: DISCONTINUED | OUTPATIENT
Start: 2025-01-27 | End: 2025-01-27

## 2025-01-27 RX ADMIN — ATORVASTATIN CALCIUM 40 MG: 40 TABLET, FILM COATED ORAL at 20:28

## 2025-01-27 RX ADMIN — FLECAINIDE ACETATE 100 MG: 100 TABLET ORAL at 20:28

## 2025-01-27 RX ADMIN — Medication 10 ML: at 10:05

## 2025-01-27 RX ADMIN — FLECAINIDE ACETATE 100 MG: 100 TABLET ORAL at 10:05

## 2025-01-27 RX ADMIN — ENOXAPARIN SODIUM 40 MG: 100 INJECTION SUBCUTANEOUS at 20:33

## 2025-01-27 RX ADMIN — CALCIUM GLUCONATE 1000 MG: 20 INJECTION, SOLUTION INTRAVENOUS at 16:00

## 2025-01-27 RX ADMIN — SODIUM CHLORIDE 125 ML/HR: 9 INJECTION, SOLUTION INTRAVENOUS at 10:04

## 2025-01-27 RX ADMIN — SODIUM CHLORIDE 1000 MG: 900 INJECTION INTRAVENOUS at 10:04

## 2025-01-27 RX ADMIN — DILTIAZEM HYDROCHLORIDE 120 MG: 120 CAPSULE, EXTENDED RELEASE ORAL at 10:05

## 2025-01-27 NOTE — H&P
Date of Admission: 1/26/2025  Primary Care Physician: Davey Xavier MD    Subjective     Chief Complaint: Syncope      This patient is a 74-year-old female with a past medical history of atrial tachycardia, SVT, iron deficiency anemia, hyperlipidemia.  She presented to the hospital yesterday evening after syncopal event.  He states she was getting ready for work and her  heard a loud sound in the other room, and she was laying in the floor.  She was mildly confused but arousable.  The syncopal event was brief, and she did not have any nausea or vomiting or diarrhea until after the episode.  This resolved after 1 dose of Zofran.  She has not had any further diarrhea or other GI symptoms since admission.  She denies any loss of bowel or bladder control, but she was having some mid to low back pain and left hip pain.  She denies any chest discomfort, shortness of breath, or palpitations prior, during, or after the event.    CT scan of the lumbar spine did show a compression deformity involving the inferior endplate of T12.  She is tender in this area and is felt to be an acute fracture from the fall.  She has some degenerative changes and mild canal stenosis.  She denies any extremity weakness or radiculopathy symptoms.  CT of the pelvis and head were negative.  Overall, she is feeling much better this morning.  She states she has been having a little bit of a cough over the last several days, but denies any fever.  Labs reveal a little bit of a low white blood cell count, and elevated neutrophils.  Renal function has improved with some IV fluids.  Creatinine was mildly elevated at 1.27, and 0.95 this morning.  She does have a low calcium.  Small amount of leukocytes on urinalysis and trace of bacteria.        Review of Systems   Constitutional:  Negative for fatigue and fever.   Respiratory:  Negative for shortness of breath.    Cardiovascular:  Positive for syncope. Negative for chest pain,  "palpitations and leg swelling.   Gastrointestinal:  Positive for diarrhea, nausea and vomiting.   Genitourinary:  Negative for urgency.   Neurological:  Positive for syncope. Negative for dizziness and weakness.        Otherwise complete ROS reviewed and negative except as mentioned in the HPI.      Past Medical History:   Past Medical History:   Diagnosis Date    Atrial tachycardia     AV block     Hospital discharge follow-up     Hyperlipidemia     ANTONIO (iron deficiency anemia)     Paroxysmal SVT (supraventricular tachycardia)        Past Surgical History:  Past Surgical History:   Procedure Laterality Date     SECTION      CHOLECYSTECTOMY      COLONOSCOPY N/A 2017    Procedure: COLONOSCOPY WITH ANESTHESIA;  Surgeon: Clifford Huynh DO;  Location: [facility] ENDOSCOPY;  Service:     EYE SURGERY Right 2022    NEPHRECTOMY Left 2017    DR. MORALES IN [place]     TONSILLECTOMY         Social History:  reports that she has never smoked. She has never used smokeless tobacco. She reports that she does not drink alcohol and does not use drugs.    Family History: family history includes Cirrhosis in her mother; Other in her father.       Allergies:  Allergies   Allergen Reactions    Novocain [Procaine] Other (See Comments)     LOCAL ANESTHETICS-Parenteral, almost passed out         Medications:  Prior to Admission medications    Medication Sig Start Date End Date Taking? Authorizing Provider   atorvastatin (LIPITOR) 40 MG tablet Take 1 tablet by mouth Every Night. 16   Provider, MD Nata   dilTIAZem CD (CARDIZEM CD) 120 MG 24 hr capsule Take 1 capsule by mouth Daily. 24   Tee Lentz MD   flecainide (TAMBOCOR) 100 MG tablet Take 1 tablet by mouth 2 (Two) Times a Day. 24   Tee Lentz MD       Objective     Vital Signs: /52 (BP Location: Right arm, Patient Position: Lying)   Pulse 54   Temp 98.3 °F (36.8 °C) (Oral)   Resp 18   Ht 167.6 cm (66\")   Wt 75 kg (165 " lb 5.5 oz)   SpO2 98%   BMI 26.69 kg/m²   Physical Exam  Constitutional:       Appearance: Normal appearance. She is normal weight.   HENT:      Head: Normocephalic and atraumatic.      Nose: Nose normal.      Mouth/Throat:      Mouth: Mucous membranes are dry.   Eyes:      Extraocular Movements: Extraocular movements intact.      Pupils: Pupils are equal, round, and reactive to light.   Cardiovascular:      Rate and Rhythm: Normal rate and regular rhythm.      Pulses: Normal pulses.      Heart sounds: Normal heart sounds.   Pulmonary:      Effort: Pulmonary effort is normal.      Breath sounds: Normal breath sounds.   Abdominal:      General: Abdomen is flat. Bowel sounds are normal.      Palpations: Abdomen is soft.   Musculoskeletal:         General: Normal range of motion.      Cervical back: Normal range of motion and neck supple.   Skin:     General: Skin is warm and dry.      Capillary Refill: Capillary refill takes less than 2 seconds.   Neurological:      General: No focal deficit present.      Mental Status: She is alert and oriented to person, place, and time.   Psychiatric:         Mood and Affect: Mood normal.         Behavior: Behavior normal.             Results Reviewed:  Lab Results (last 24 hours)       Procedure Component Value Units Date/Time    Comprehensive Metabolic Panel [[id number]]  (Abnormal) Collected: 01/27/25 0441    Specimen: Blood Updated: 01/27/25 0523     Glucose 86 mg/dL      BUN 16 mg/dL      Creatinine 0.95 mg/dL      Sodium 139 mmol/L      Potassium 3.7 mmol/L      Chloride 108 mmol/L      CO2 21.0 mmol/L      Calcium 7.2 mg/dL      Total Protein 5.1 g/dL      Albumin 3.1 g/dL      ALT (SGPT) 15 U/L      AST (SGOT) 24 U/L      Alkaline Phosphatase 66 U/L      Total Bilirubin 0.3 mg/dL      Globulin 2.0 gm/dL      A/G Ratio 1.6 g/dL      BUN/Creatinine Ratio 16.8     Anion Gap 10.0 mmol/L      eGFR 63.4 mL/min/1.73     Narrative:      GFR Categories in Chronic Kidney Disease  (CKD)      GFR Category          GFR (mL/min/1.73)    Interpretation  G1                     90 or greater         Normal or high (1)  G2                      60-89                Mild decrease (1)  G3a                   45-59                Mild to moderate decrease  G3b                   30-44                Moderate to severe decrease  G4                    15-29                Severe decrease  G5                    14 or less           Kidney failure          (1)In the absence of evidence of kidney disease, neither GFR category G1 or G2 fulfill the criteria for CKD.    eGFR calculation 2021 CKD-EPI creatinine equation, which does not include race as a factor    CBC Auto Differential [[id number]]  (Abnormal) Collected: 01/27/25 0441    Specimen: Blood Updated: 01/27/25 0505     WBC 3.29 10*3/mm3      RBC 3.94 10*6/mm3      Hemoglobin 11.5 g/dL      Hematocrit 36.5 %      MCV 92.6 fL      MCH 29.2 pg      MCHC 31.5 g/dL      RDW 13.3 %      RDW-SD 45.8 fl      MPV 9.8 fL      Platelets 157 10*3/mm3      Neutrophil % 38.0 %      Lymphocyte % 44.4 %      Monocyte % 14.3 %      Eosinophil % 2.1 %      Basophil % 0.6 %      Immature Grans % 0.6 %      Neutrophils, Absolute 1.25 10*3/mm3      Lymphocytes, Absolute 1.46 10*3/mm3      Monocytes, Absolute 0.47 10*3/mm3      Eosinophils, Absolute 0.07 10*3/mm3      Basophils, Absolute 0.02 10*3/mm3      Immature Grans, Absolute 0.02 10*3/mm3      nRBC 0.0 /100 WBC     Calcium, Ionized [[id number]]  (Abnormal) Collected: 01/27/25 0446    Specimen: Blood Updated: 01/27/25 0447     Ionized Calcium 3.89 mg/dL      Comment: 84 Value below reference range        Collected by [id number]     Comment: Meter: [id number]     :  [id number]       Urinalysis With Culture If Indicated - Urine, Clean Catch [[id number]]  (Abnormal) Collected: 01/26/25 1625    Specimen: Urine, Clean Catch Updated: 01/26/25 1652     Color, UA Yellow     Appearance, UA Clear     pH, UA 5.5     Specific  Gravity, UA 1.010     Glucose, UA Negative     Ketones, UA Negative     Bilirubin, UA Negative     Blood, UA Moderate (2+)     Protein, UA Trace     Leuk Esterase, UA Small (1+)     Nitrite, UA Negative     Urobilinogen, UA 0.2 E.U./dL    Narrative:      In absence of clinical symptoms, the presence of pyuria, bacteria, and/or nitrites on the urinalysis result does not correlate with infection.    Urinalysis, Microscopic Only - Urine, Clean Catch [[id number]]  (Abnormal) Collected: 01/26/25 1625    Specimen: Urine, Clean Catch Updated: 01/26/25 1652     RBC, UA 3-5 /HPF      WBC, UA 6-10 /HPF      Bacteria, UA Trace /HPF      Squamous Epithelial Cells, UA 0-2 /HPF      Hyaline Casts, UA None Seen /LPF      Methodology Manual Light Microscopy    Urine Culture - Urine, Urine, Clean Catch [[id number]] Collected: 01/26/25 1625    Specimen: Urine, Clean Catch Updated: 01/26/25 1652    High Sensitivity Troponin T 1Hr [[id number]]  (Normal) Collected: 01/26/25 1456    Specimen: Blood from Arm, Left Updated: 01/26/25 1520     HS Troponin T 11 ng/L      Troponin T Numeric Delta -1 ng/L     Narrative:      High Sensitive Troponin T Reference Range:  <14.0 ng/L- Negative Female for AMI  <22.0 ng/L- Negative Male for AMI  >=14 - Abnormal Female indicating possible myocardial injury.  >=22 - Abnormal Male indicating possible myocardial injury.   Clinicians would have to utilize clinical acumen, EKG, Troponin, and serial changes to determine if it is an Acute Myocardial Infarction or myocardial injury due to an underlying chronic condition.         BNP [[id number]]  (Normal) Collected: 01/26/25 1315    Specimen: Blood Updated: 01/26/25 1405     proBNP 473.1 pg/mL     Narrative:      This assay is used as an aid in the diagnosis of individuals suspected of having heart failure. It can be used as an aid in the diagnosis of acute decompensated heart failure (ADHF) in patients presenting with signs and symptoms of ADHF to the emergency  department (ED). In addition, NT-proBNP of <300 pg/mL indicates ADHF is not likely.    Age Range Result Interpretation  NT-proBNP Concentration (pg/mL:      <50             Positive            >450                   Gray                 300-450                    Negative             <300    50-75           Positive            >900                  Gray                300-900                  Negative            <300      >75             Positive            >1800                  Gray                300-1800                  Negative            <300    High Sensitivity Troponin T [[id number]]  (Normal) Collected: 01/26/25 1315    Specimen: Blood Updated: 01/26/25 1405     HS Troponin T 12 ng/L     Narrative:      High Sensitive Troponin T Reference Range:  <14.0 ng/L- Negative Female for AMI  <22.0 ng/L- Negative Male for AMI  >=14 - Abnormal Female indicating possible myocardial injury.  >=22 - Abnormal Male indicating possible myocardial injury.   Clinicians would have to utilize clinical acumen, EKG, Troponin, and serial changes to determine if it is an Acute Myocardial Infarction or myocardial injury due to an underlying chronic condition.         Comprehensive Metabolic Panel [[id number]]  (Abnormal) Collected: 01/26/25 1315    Specimen: Blood Updated: 01/26/25 1347     Glucose 100 mg/dL      BUN 17 mg/dL      Creatinine 1.27 mg/dL      Sodium 136 mmol/L      Potassium 3.9 mmol/L      Chloride 104 mmol/L      CO2 24.0 mmol/L      Calcium 7.8 mg/dL      Total Protein 6.5 g/dL      Albumin 3.7 g/dL      ALT (SGPT) 23 U/L      AST (SGOT) 31 U/L      Alkaline Phosphatase 81 U/L      Total Bilirubin 0.3 mg/dL      Globulin 2.8 gm/dL      A/G Ratio 1.3 g/dL      BUN/Creatinine Ratio 13.4     Anion Gap 8.0 mmol/L      eGFR 44.7 mL/min/1.73     Narrative:      GFR Categories in Chronic Kidney Disease (CKD)      GFR Category          GFR (mL/min/1.73)    Interpretation  G1                     90 or greater         Normal  or high (1)  G2                      60-89                Mild decrease (1)  G3a                   45-59                Mild to moderate decrease  G3b                   30-44                Moderate to severe decrease  G4                    15-29                Severe decrease  G5                    14 or less           Kidney failure          (1)In the absence of evidence of kidney disease, neither GFR category G1 or G2 fulfill the criteria for CKD.    eGFR calculation 2021 CKD-EPI creatinine equation, which does not include race as a factor    Magnesium [[id number]]  (Normal) Collected: 01/26/25 1315    Specimen: Blood Updated: 01/26/25 1347     Magnesium 1.9 mg/dL     Lipase [[id number]]  (Normal) Collected: 01/26/25 1315    Specimen: Blood Updated: 01/26/25 1342     Lipase 43 U/L     Protime-INR [[id number]]  (Normal) Collected: 01/26/25 1315    Specimen: Blood Updated: 01/26/25 1336     Protime 13.4 Seconds      INR 0.98    aPTT [[id number]]  (Normal) Collected: 01/26/25 1315    Specimen: Blood Updated: 01/26/25 1336     PTT 26.5 seconds     CBC & Differential [[id number]]  (Abnormal) Collected: 01/26/25 1315    Specimen: Blood Updated: 01/26/25 1328    Narrative:      The following orders were created for panel order CBC & Differential.  Procedure                               Abnormality         Status                     ---------                               -----------         ------                     CBC Auto Differential[[id number]]        Abnormal            Final result                 Please view results for these tests on the individual orders.    CBC Auto Differential [[id number]]  (Abnormal) Collected: 01/26/25 1315    Specimen: Blood Updated: 01/26/25 1328     WBC 4.17 10*3/mm3      RBC 4.34 10*6/mm3      Hemoglobin 12.8 g/dL      Hematocrit 40.3 %      MCV 92.9 fL      MCH 29.5 pg      MCHC 31.8 g/dL      RDW 13.2 %      RDW-SD 45.1 fl      MPV 9.6 fL      Platelets 165 10*3/mm3      Neutrophil %  66.2 %      Lymphocyte % 21.8 %      Monocyte % 9.8 %      Eosinophil % 0.7 %      Basophil % 0.5 %      Immature Grans % 1.0 %      Neutrophils, Absolute 2.76 10*3/mm3      Lymphocytes, Absolute 0.91 10*3/mm3      Monocytes, Absolute 0.41 10*3/mm3      Eosinophils, Absolute 0.03 10*3/mm3      Basophils, Absolute 0.02 10*3/mm3      Immature Grans, Absolute 0.04 10*3/mm3      nRBC 0.0 /100 WBC           Imaging Results (Last 24 Hours)       Procedure Component Value Units Date/Time    CT Lumbar Spine Without Contrast [[id number]] Collected: 01/26/25 1418     Updated: 01/26/25 1426    Narrative:      CT LUMBAR SPINE WO CONTRAST- 1/26/2025 12:55 PM     HISTORY: Fall injury; R55-Syncope and collapse; S33.5XXA-Sprain of  ligaments of lumbar spine, initial encounter      COMPARISON: CT abdomen pelvis 5/8/2017     TOTAL DOSE LENGTH PRODUCT: 767.16 mGy.cm. Automated exposure control was  also utilized to decrease patient radiation dose.     TECHNIQUE: Axial images of the lumbar spine obtained with sagittal and  coronal reconstructions.     FINDINGS: There is age indeterminate compression of the inferior  endplate of T12, finding new compared to the 2017 study. No lumbar  vertebral compression deformity or pars defect. Advanced degenerative  disc change L5-S1 with mild degenerative disc change above this level.  There is mild to moderate facet osteoarthropathy. Degenerative changes  result in mild central canal stenosis at L3/4 with mild to moderate  right L4/5 foraminal stenosis. Mild bilateral L5-S1 foraminal narrowing.     Moderate vascular calcifications. Left kidney is absent.       Impression:      Age-indeterminate compression deformity involving the inferior endplate  of T12 is new compared to the 5/8/2017 study. Subacute or chronic  finding is favored, however correlation for regional tenderness  recommended. No acute lumbar vertebral fracture or traumatic  malalignment. Degenerative disc changes greatest at L5-S1.  Mild central  canal stenosis at L3/4 with mild to moderate right L4/5 foraminal  narrowing. Please refer to dictation above for detailed findings at each  level.     This report was signed and finalized on 1/26/2025 2:23 PM by Dr. Juani Orozco MD.       CT Pelvis Without Contrast [[id number]] Collected: 01/26/25 1415     Updated: 01/26/25 1421    Narrative:      CT PELVIS WO CONTRAST- 1/26/2025 12:55 PM     HISTORY: Left hip pain and sacral pain after fall; R55-Syncope and  collapse; S33.5XXA-Sprain of ligaments of lumbar spine, initial  encounter      COMPARISON: CT abdomen pelvis 5/8/2017     TOTAL DOSE LENGTH PRODUCT: 767.16 mGy.cm. Automated exposure control was  also utilized to decrease patient radiation dose.     TECHNIQUE: Axial images of the pelvis are performed without IV contrast.  Sagittal and coronal images reformatted.     FINDINGS: No acute bony fracture or hip dislocation. There are moderate  arthritic changes of the bilateral hips with joint space narrowing and  mild bony spurring of the femoral heads at the junction with the femoral  necks. Enthesophytes project from the greater trochanters. Degenerative  change within the lower lumbar spine. Stable alignment of the distal  coccyx. No soft tissue hematoma or abnormal fluid collection.     Bladder is distended. Uterus and ovaries are unremarkable. No pelvic  mass. Mild sigmoid colonic diverticulosis.       Impression:      No acute bony injury of the pelvis or hips. Arthritic changes as  described above.     This report was signed and finalized on 1/26/2025 2:18 PM by Dr. Juani Orozco MD.       CT Head Without Contrast [[id number]] Collected: 01/26/25 1330     Updated: 01/26/25 1334    Narrative:      CT HEAD WO CONTRAST- 1/26/2025 12:02 PM     HISTORY: Near syncope      COMPARISON: 3/6/2022     TOTAL DOSE LENGTH PRODUCT: 675.59 mGy.cm. Automated exposure control was  also utilized to decrease patient radiation dose.     TECHNIQUE: Axial  images of brain obtained without contrast     FINDINGS: Ventricles are normal in size and configuration. No  intracranial hemorrhage or mass effect. No acute signs of ischemia. No  extra-axial hematoma or subarachnoid hemorrhage. Empty appearing sella.     Chronic mucosal thickening of the paranasal sinuses. Mastoid air cells  appear well-aerated. Bony calvarium appears intact.       Impression:         1. No acute intracranial abnormality.  2. Chronic sinusitis.     This report was signed and finalized on 1/26/2025 1:31 PM by Dr. Juain Orozco MD.               I have personally reviewed and interpreted the radiology studies and ECG obtained at time of admission.     Assessment / Plan     Assessment & Plan  Active Hospital Problems    Diagnosis     **Syncope      1.  Continue with gentle IV fluids today.  Symptoms seem to be related to volume depletion.  Monitor renal function and electrolytes.  2.  Add respiratory panel.  She has not had any more diarrhea, but a GI panel is ordered to be collected if able.  3.  IV Rocephin today for possible UTI.  Urine culture pending.  4.  Continue telemetry.  BNP and troponins were normal.  5.  If dizziness returns or she has any new neurologic symptoms will consult neurology and order MRI/EEG.  However, since her symptoms have resolved we will hold off for now.  6.  Will discuss adding calcium with Dr. Xavier.    Further orders per Dr. Xavier.      Francesco Carrizales, APRN   01/27/25   08:29 CST

## 2025-01-27 NOTE — PLAN OF CARE
Goal Outcome Evaluation:   Vital signs stable.  No c/o pain.  Tele read sinus yanely-sinus rhythm from 56-72 bpm.  Safety maintained.

## 2025-01-28 VITALS
RESPIRATION RATE: 18 BRPM | WEIGHT: 165 LBS | HEIGHT: 66 IN | DIASTOLIC BLOOD PRESSURE: 58 MMHG | BODY MASS INDEX: 26.52 KG/M2 | OXYGEN SATURATION: 91 % | SYSTOLIC BLOOD PRESSURE: 139 MMHG | HEART RATE: 55 BPM | TEMPERATURE: 99.5 F

## 2025-01-28 PROBLEM — E55.9 VITAMIN D DEFICIENCY: Status: ACTIVE | Noted: 2025-01-28

## 2025-01-28 PROBLEM — E83.51 HYPOCALCEMIA: Status: ACTIVE | Noted: 2025-01-28

## 2025-01-28 PROBLEM — R91.1 LUNG NODULE: Status: ACTIVE | Noted: 2025-01-28

## 2025-01-28 LAB
ALBUMIN SERPL-MCNC: 3 G/DL (ref 3.5–5.2)
ALBUMIN/GLOB SERPL: 1.4 G/DL
ALP SERPL-CCNC: 66 U/L (ref 39–117)
ALT SERPL W P-5'-P-CCNC: 14 U/L (ref 1–33)
ANION GAP SERPL CALCULATED.3IONS-SCNC: 9 MMOL/L (ref 5–15)
AST SERPL-CCNC: 22 U/L (ref 1–32)
AV MEAN PRESS GRAD SYS DOP V1V2: 4 MMHG
AV VMAX SYS DOP: 145 CM/SEC
BH CV ECHO LEFT VENTRICLE GLOBAL LONGITUDINAL STRAIN: -21.1 %
BH CV ECHO MEAS - AO MAX PG: 8.4 MMHG
BH CV ECHO MEAS - AO ROOT DIAM: 2.9 CM
BH CV ECHO MEAS - AO V2 VTI: 33.2 CM
BH CV ECHO MEAS - AVA(I,D): 2.23 CM2
BH CV ECHO MEAS - EDV(CUBED): 97.3 ML
BH CV ECHO MEAS - EDV(MOD-SP4): 63.9 ML
BH CV ECHO MEAS - EF(MOD-SP4): 66 %
BH CV ECHO MEAS - ESV(CUBED): 19.7 ML
BH CV ECHO MEAS - ESV(MOD-SP4): 21.7 ML
BH CV ECHO MEAS - FS: 41.3 %
BH CV ECHO MEAS - IVS/LVPW: 1.22 CM
BH CV ECHO MEAS - IVSD: 1.1 CM
BH CV ECHO MEAS - LA DIMENSION: 3.2 CM
BH CV ECHO MEAS - LAT PEAK E' VEL: 9 CM/SEC
BH CV ECHO MEAS - LV DIASTOLIC VOL/BSA (35-75): 34.7 CM2
BH CV ECHO MEAS - LV MASS(C)D: 158.8 GRAMS
BH CV ECHO MEAS - LV MAX PG: 2.9 MMHG
BH CV ECHO MEAS - LV MEAN PG: 2 MMHG
BH CV ECHO MEAS - LV SYSTOLIC VOL/BSA (12-30): 11.8 CM2
BH CV ECHO MEAS - LV V1 MAX: 85.3 CM/SEC
BH CV ECHO MEAS - LV V1 VTI: 21.4 CM
BH CV ECHO MEAS - LVIDD: 4.6 CM
BH CV ECHO MEAS - LVIDS: 2.7 CM
BH CV ECHO MEAS - LVOT AREA: 3.5 CM2
BH CV ECHO MEAS - LVOT DIAM: 2.1 CM
BH CV ECHO MEAS - LVPWD: 0.9 CM
BH CV ECHO MEAS - MED PEAK E' VEL: 7.3 CM/SEC
BH CV ECHO MEAS - MV A MAX VEL: 102 CM/SEC
BH CV ECHO MEAS - MV DEC TIME: 0.32 SEC
BH CV ECHO MEAS - MV E MAX VEL: 78.4 CM/SEC
BH CV ECHO MEAS - MV E/A: 0.77
BH CV ECHO MEAS - RAP SYSTOLE: 3 MMHG
BH CV ECHO MEAS - RVSP: 28.8 MMHG
BH CV ECHO MEAS - SV(LVOT): 74.1 ML
BH CV ECHO MEAS - SV(MOD-SP4): 42.2 ML
BH CV ECHO MEAS - SVI(LVOT): 40.2 ML/M2
BH CV ECHO MEAS - SVI(MOD-SP4): 22.9 ML/M2
BH CV ECHO MEAS - TR MAX PG: 25.8 MMHG
BH CV ECHO MEAS - TR MAX VEL: 254 CM/SEC
BH CV ECHO MEASUREMENTS AVERAGE E/E' RATIO: 9.62
BILIRUB SERPL-MCNC: 0.2 MG/DL (ref 0–1.2)
BUN SERPL-MCNC: 12 MG/DL (ref 8–23)
BUN/CREAT SERPL: 12.1 (ref 7–25)
CA-I BLD-MCNC: 3.85 MG/DL (ref 4.6–5.4)
CALCIUM SPEC-SCNC: 7.8 MG/DL (ref 8.6–10.5)
CHLORIDE SERPL-SCNC: 108 MMOL/L (ref 98–107)
CO2 SERPL-SCNC: 23 MMOL/L (ref 22–29)
CREAT SERPL-MCNC: 0.99 MG/DL (ref 0.57–1)
EGFRCR SERPLBLD CKD-EPI 2021: 60.3 ML/MIN/1.73
GLOBULIN UR ELPH-MCNC: 2.2 GM/DL
GLUCOSE SERPL-MCNC: 85 MG/DL (ref 65–99)
LEFT ATRIUM VOLUME INDEX: 25.9 ML/M2
LEFT ATRIUM VOLUME: 47.6 ML
Lab: ABNORMAL
POTASSIUM SERPL-SCNC: 3.7 MMOL/L (ref 3.5–5.2)
PROT SERPL-MCNC: 5.2 G/DL (ref 6–8.5)
SODIUM SERPL-SCNC: 140 MMOL/L (ref 136–145)

## 2025-01-28 PROCEDURE — 25010000002 CALCIUM GLUCONATE 2-0.675 GM/100ML-% SOLUTION: Performed by: INTERNAL MEDICINE

## 2025-01-28 PROCEDURE — G0378 HOSPITAL OBSERVATION PER HR: HCPCS

## 2025-01-28 PROCEDURE — 82330 ASSAY OF CALCIUM: CPT

## 2025-01-28 PROCEDURE — 80053 COMPREHEN METABOLIC PANEL: CPT | Performed by: INTERNAL MEDICINE

## 2025-01-28 RX ORDER — CALCIUM GLUCONATE 20 MG/ML
2000 INJECTION, SOLUTION INTRAVENOUS ONCE
Status: COMPLETED | OUTPATIENT
Start: 2025-01-28 | End: 2025-01-28

## 2025-01-28 RX ORDER — CALCIUM CARBONATE 500(1250)
500 TABLET ORAL 2 TIMES DAILY
Status: DISCONTINUED | OUTPATIENT
Start: 2025-01-28 | End: 2025-01-28 | Stop reason: HOSPADM

## 2025-01-28 RX ORDER — CALCIUM CARBONATE 500(1250)
500 TABLET ORAL 2 TIMES DAILY
Qty: 90 TABLET | Refills: 3 | Status: SHIPPED | OUTPATIENT
Start: 2025-01-28

## 2025-01-28 RX ADMIN — FLECAINIDE ACETATE 100 MG: 100 TABLET ORAL at 08:33

## 2025-01-28 RX ADMIN — Medication 5000 UNITS: at 08:32

## 2025-01-28 RX ADMIN — DILTIAZEM HYDROCHLORIDE 120 MG: 120 CAPSULE, EXTENDED RELEASE ORAL at 08:33

## 2025-01-28 RX ADMIN — CALCIUM 500 MG: 500 TABLET ORAL at 08:32

## 2025-01-28 RX ADMIN — CALCIUM GLUCONATE 2000 MG: 20 INJECTION, SOLUTION INTRAVENOUS at 06:28

## 2025-01-28 NOTE — PLAN OF CARE
Problem: Comorbidity Management  Goal: Blood Pressure in Desired Range  Outcome: Progressing  Intervention: Maintain Blood Pressure Management  Recent Flowsheet Documentation  Taken 1/27/2025 0800 by Zhane Stern RN  Medication Review/Management: medications reviewed     Problem: Fall Injury Risk  Goal: Absence of Fall and Fall-Related Injury  Outcome: Progressing  Intervention: Identify and Manage Contributors  Recent Flowsheet Documentation  Taken 1/27/2025 0800 by Zhane Stern RN  Medication Review/Management: medications reviewed  Intervention: Promote Injury-Free Environment  Recent Flowsheet Documentation  Taken 1/27/2025 0800 by Zhane Stern RN  Safety Promotion/Fall Prevention:   clutter free environment maintained   nonskid shoes/slippers when out of bed   safety round/check completed   Goal Outcome Evaluation:       No significant changes to patient this shift. CXR done. Pending results. ECHO scheduled. Pt up with stand by and steady gait to bathroom.  assists. SR/SB on monitor. Pt has had no complaints of pain or discomfort during shift. VSS. Bed remained in low locked position with side rails up X2 and call light always in reach.  remained at bedside during entire shift.

## 2025-01-28 NOTE — PLAN OF CARE
Problem: Adult Inpatient Plan of Care  Goal: Plan of Care Review  Outcome: Progressing  Flowsheets (Taken 1/28/2025 5376)  Progress: no change  Outcome Evaluation: No c/o pain. SB/S 55-62 on tele. Pt up stand by to the bathroom. Respiratory panel came back postive for Flu A. Droplet precautions in use. Safety maintained.  Plan of Care Reviewed With: patient

## 2025-01-28 NOTE — DISCHARGE SUMMARY
DISCHARGE SUMMARY       Date of Admission: 1/26/2025  Date of Discharge:  1/28/2025  Primary Care Physician: Davey Xavier MD    Discharge Diagnoses:    Syncope    Hypocalcemia    Vitamin D deficiency    Lung nodule        Presenting Problem/History of Present Illness  Syncope [R55]       Hospital Course  This patient is a 74-year-old female past medical history of atrial tachycardia, SVT, iron deficiency anemia, hyperlipidemia, renal cancer.  She presented to the hospital after syncopal episode when she was getting ready for work and her  found her in her room laying on the floor.  She was mildly confused but arousable.  She began having nausea vomiting and diarrhea after the episode, but did not have any GI symptoms prior.  Although symptoms resolved after 1 dose of Zofran in the emergency department.  She has not had any further GI symptoms since admission.  She was complaining of some mid to low back pain and left hip pain.  She denied any chest discomfort, shortness of breath, palpitations prior during or after the event.  She has been asymptomatic since admission.  CT scan of the lumbar spine did show a new compression deformity of the inferior endplate of T12 she is tender in this area, felt to be from the fall.  She has some degenerative changes and mild canal stenosis, but no lower extremity weakness or radiculopathy symptoms.  CT of the pelvis was normal.  An echocardiogram was performed and was normal.      Of note she has had some respiratory symptoms with cough and congestion since Saturday.  Respiratory panel is ordered and she is positive for influenza A.  Laboratory workup was largely unremarkable other than extremely low calcium and vitamin D levels.  She did have evidence of a possible UTI, but urine culture was negative.  She received 1 dose of IV Rocephin.  Antibiotics were discontinued.  She does have a history of SVT, she has been sinus bradycardia, with no  dysrhythmias.      It is felt that her syncopal episode was a combination of acute illness with influenza, dehydration, hypocalcemia.  She received gentle IV fluids on admission, but discontinued due to concerns for possible volume overload with her 1 kidney.  She does have pulmonary nodules which are being monitored by [facility] every 6 months.  She feels good this morning and is ready to be discharged.  She was counseled on starting calcium and vitamin D supplements daily.  She will continue with her home antiarrhythmics.  Follow-up with us in our office as scheduled in the next few days and will get repeat labs.    Procedures Performed         Consults:   Consults       No orders found from 12/28/2024 to 1/27/2025.            Pertinent Test Results:  Lab Results (most recent)       Procedure Component Value Units Date/Time    Comprehensive Metabolic Panel [[id number]]  (Abnormal) Collected: 01/28/25 0325    Specimen: Blood Updated: 01/28/25 0501     Glucose 85 mg/dL      BUN 12 mg/dL      Creatinine 0.99 mg/dL      Sodium 140 mmol/L      Potassium 3.7 mmol/L      Chloride 108 mmol/L      CO2 23.0 mmol/L      Calcium 7.8 mg/dL      Total Protein 5.2 g/dL      Albumin 3.0 g/dL      ALT (SGPT) 14 U/L      AST (SGOT) 22 U/L      Alkaline Phosphatase 66 U/L      Total Bilirubin 0.2 mg/dL      Globulin 2.2 gm/dL      A/G Ratio 1.4 g/dL      BUN/Creatinine Ratio 12.1     Anion Gap 9.0 mmol/L      eGFR 60.3 mL/min/1.73     Narrative:      GFR Categories in Chronic Kidney Disease (CKD)      GFR Category          GFR (mL/min/1.73)    Interpretation  G1                     90 or greater         Normal or high (1)  G2                      60-89                Mild decrease (1)  G3a                   45-59                Mild to moderate decrease  G3b                   30-44                Moderate to severe decrease  G4                    15-29                Severe decrease  G5                    14 or less           Kidney  failure          (1)In the absence of evidence of kidney disease, neither GFR category G1 or G2 fulfill the criteria for CKD.    eGFR calculation 2021 CKD-EPI creatinine equation, which does not include race as a factor    Calcium, Ionized [[id number]]  (Abnormal) Collected: 01/28/25 0416    Specimen: Blood Updated: 01/28/25 0418     Ionized Calcium 3.85 mg/dL      Comment: 84 Value below reference range        Collected by [id number]     Comment: Meter: [id number]     :  James Del Angel CRT       Respiratory Panel PCR w/COVID-19(SARS-CoV-2) G[facility]/KIM/ROHAN/PAD/COR/MASON In-House, NP Swab in UTM/VTM, 2 HR TAT - Swab, Nasopharynx [[id number]]  (Abnormal) Collected: 01/1950    Specimen: Swab from Nasopharynx Updated: 01/27/25 2106     ADENOVIRUS, PCR Not Detected     Coronavirus 229E Not Detected     Coronavirus HKU1 Not Detected     Coronavirus NL63 Not Detected     Coronavirus OC43 Not Detected     COVID19 Not Detected     Human Metapneumovirus Not Detected     Human Rhinovirus/Enterovirus Not Detected     Influenza A H1 2009 PCR Detected     Influenza B PCR Not Detected     Parainfluenza Virus 1 Not Detected     Parainfluenza Virus 2 Not Detected     Parainfluenza Virus 3 Not Detected     Parainfluenza Virus 4 Not Detected     RSV, PCR Not Detected     Bordetella pertussis pcr Not Detected     Bordetella parapertussis PCR Not Detected     Chlamydophila pneumoniae PCR Not Detected     Mycoplasma pneumo by PCR Not Detected    Narrative:      In the setting of a positive respiratory panel with a viral infection PLUS a negative procalcitonin without other underlying concern for bacterial infection, consider observing off antibiotics or discontinuation of antibiotics and continue supportive care. If the respiratory panel is positive for atypical bacterial infection (Bordetella pertussis, Chlamydophila pneumoniae, or Mycoplasma pneumoniae), consider antibiotic de-escalation to target atypical bacterial infection.     Vitamin D,25-Hydroxy [[id number]]  (Abnormal) Collected: 01/27/25 1303    Specimen: Blood Updated: 01/27/25 1830     25 Hydroxy, Vitamin D 6.8 ng/ml     Narrative:      Reference Range for Total Vitamin D 25(OH)     Deficiency <20.0 ng/mL   Insufficiency 21-29 ng/mL   Sufficiency  ng/mL  Toxicity >100 ng/ml      PTH, Intact [[id number]]  (Abnormal) Collected: 01/27/25 1303    Specimen: Blood Updated: 01/27/25 1345     PTH, Intact 66.2 pg/mL     Narrative:      Results may be falsely decreased if patient taking Biotin.      Urine Culture - Urine, Urine, Clean Catch [[id number]] Collected: 01/26/25 1625    Specimen: Urine, Clean Catch Updated: 01/27/25 1209     Urine Culture >100,000 CFU/mL Mixed Linda Isolated    Narrative:      Specimen contains mixed organisms of questionable pathogenicity suggestive of contamination. If symptoms persist, suggest recollection.  Colonization of the urinary tract without infection is common. Treatment is discouraged unless the patient is symptomatic, pregnant, or undergoing an invasive urologic procedure.    Comprehensive Metabolic Panel [[id number]]  (Abnormal) Collected: 01/27/25 0441    Specimen: Blood Updated: 01/27/25 0523     Glucose 86 mg/dL      BUN 16 mg/dL      Creatinine 0.95 mg/dL      Sodium 139 mmol/L      Potassium 3.7 mmol/L      Chloride 108 mmol/L      CO2 21.0 mmol/L      Calcium 7.2 mg/dL      Total Protein 5.1 g/dL      Albumin 3.1 g/dL      ALT (SGPT) 15 U/L      AST (SGOT) 24 U/L      Alkaline Phosphatase 66 U/L      Total Bilirubin 0.3 mg/dL      Globulin 2.0 gm/dL      A/G Ratio 1.6 g/dL      BUN/Creatinine Ratio 16.8     Anion Gap 10.0 mmol/L      eGFR 63.4 mL/min/1.73     Narrative:      GFR Categories in Chronic Kidney Disease (CKD)      GFR Category          GFR (mL/min/1.73)    Interpretation  G1                     90 or greater         Normal or high (1)  G2                      60-89                Mild decrease (1)  G3a                   45-59                 Mild to moderate decrease  G3b                   30-44                Moderate to severe decrease  G4                    15-29                Severe decrease  G5                    14 or less           Kidney failure          (1)In the absence of evidence of kidney disease, neither GFR category G1 or G2 fulfill the criteria for CKD.    eGFR calculation 2021 CKD-EPI creatinine equation, which does not include race as a factor    CBC Auto Differential [[id number]]  (Abnormal) Collected: 01/27/25 0441    Specimen: Blood Updated: 01/27/25 0505     WBC 3.29 10*3/mm3      RBC 3.94 10*6/mm3      Hemoglobin 11.5 g/dL      Hematocrit 36.5 %      MCV 92.6 fL      MCH 29.2 pg      MCHC 31.5 g/dL      RDW 13.3 %      RDW-SD 45.8 fl      MPV 9.8 fL      Platelets 157 10*3/mm3      Neutrophil % 38.0 %      Lymphocyte % 44.4 %      Monocyte % 14.3 %      Eosinophil % 2.1 %      Basophil % 0.6 %      Immature Grans % 0.6 %      Neutrophils, Absolute 1.25 10*3/mm3      Lymphocytes, Absolute 1.46 10*3/mm3      Monocytes, Absolute 0.47 10*3/mm3      Eosinophils, Absolute 0.07 10*3/mm3      Basophils, Absolute 0.02 10*3/mm3      Immature Grans, Absolute 0.02 10*3/mm3      nRBC 0.0 /100 WBC     Calcium, Ionized [[id number]]  (Abnormal) Collected: 01/27/25 0446    Specimen: Blood Updated: 01/27/25 0447     Ionized Calcium 3.89 mg/dL      Comment: 84 Value below reference range        Collected by [id number]     Comment: Meter: [id number]     :  [id number]       Urinalysis With Culture If Indicated - Urine, Clean Catch [[id number]]  (Abnormal) Collected: 01/26/25 1625    Specimen: Urine, Clean Catch Updated: 01/26/25 1652     Color, UA Yellow     Appearance, UA Clear     pH, UA 5.5     Specific Gravity, UA 1.010     Glucose, UA Negative     Ketones, UA Negative     Bilirubin, UA Negative     Blood, UA Moderate (2+)     Protein, UA Trace     Leuk Esterase, UA Small (1+)     Nitrite, UA Negative     Urobilinogen, UA 0.2  E.U./dL    Narrative:      In absence of clinical symptoms, the presence of pyuria, bacteria, and/or nitrites on the urinalysis result does not correlate with infection.    Urinalysis, Microscopic Only - Urine, Clean Catch [[id number]]  (Abnormal) Collected: 01/26/25 1625    Specimen: Urine, Clean Catch Updated: 01/26/25 1652     RBC, UA 3-5 /HPF      WBC, UA 6-10 /HPF      Bacteria, UA Trace /HPF      Squamous Epithelial Cells, UA 0-2 /HPF      Hyaline Casts, UA None Seen /LPF      Methodology Manual Light Microscopy    High Sensitivity Troponin T 1Hr [[id number]]  (Normal) Collected: 01/26/25 1456    Specimen: Blood from Arm, Left Updated: 01/26/25 1520     HS Troponin T 11 ng/L      Troponin T Numeric Delta -1 ng/L     Narrative:      High Sensitive Troponin T Reference Range:  <14.0 ng/L- Negative Female for AMI  <22.0 ng/L- Negative Male for AMI  >=14 - Abnormal Female indicating possible myocardial injury.  >=22 - Abnormal Male indicating possible myocardial injury.   Clinicians would have to utilize clinical acumen, EKG, Troponin, and serial changes to determine if it is an Acute Myocardial Infarction or myocardial injury due to an underlying chronic condition.         BNP [[id number]]  (Normal) Collected: 01/26/25 1315    Specimen: Blood Updated: 01/26/25 1405     proBNP 473.1 pg/mL     Narrative:      This assay is used as an aid in the diagnosis of individuals suspected of having heart failure. It can be used as an aid in the diagnosis of acute decompensated heart failure (ADHF) in patients presenting with signs and symptoms of ADHF to the emergency department (ED). In addition, NT-proBNP of <300 pg/mL indicates ADHF is not likely.    Age Range Result Interpretation  NT-proBNP Concentration (pg/mL:      <50             Positive            >450                   Gray                 300-450                    Negative             <300    50-75           Positive            >900                  Shanks                 300-900                  Negative            <300      >75             Positive            >1800                  Gray                300-1800                  Negative            <300    High Sensitivity Troponin T [[id number]]  (Normal) Collected: 01/26/25 1315    Specimen: Blood Updated: 01/26/25 1405     HS Troponin T 12 ng/L     Narrative:      High Sensitive Troponin T Reference Range:  <14.0 ng/L- Negative Female for AMI  <22.0 ng/L- Negative Male for AMI  >=14 - Abnormal Female indicating possible myocardial injury.  >=22 - Abnormal Male indicating possible myocardial injury.   Clinicians would have to utilize clinical acumen, EKG, Troponin, and serial changes to determine if it is an Acute Myocardial Infarction or myocardial injury due to an underlying chronic condition.         Magnesium [[id number]]  (Normal) Collected: 01/26/25 1315    Specimen: Blood Updated: 01/26/25 1347     Magnesium 1.9 mg/dL     Lipase [[id number]]  (Normal) Collected: 01/26/25 1315    Specimen: Blood Updated: 01/26/25 1342     Lipase 43 U/L     Protime-INR [[id number]]  (Normal) Collected: 01/26/25 1315    Specimen: Blood Updated: 01/26/25 1336     Protime 13.4 Seconds      INR 0.98    aPTT [[id number]]  (Normal) Collected: 01/26/25 1315    Specimen: Blood Updated: 01/26/25 1336     PTT 26.5 seconds     CBC & Differential [[id number]]  (Abnormal) Collected: 01/26/25 1315    Specimen: Blood Updated: 01/26/25 1328    Narrative:      The following orders were created for panel order CBC & Differential.  Procedure                               Abnormality         Status                     ---------                               -----------         ------                     CBC Auto Differential[[id number]]        Abnormal            Final result                 Please view results for these tests on the individual orders.    CBC Auto Differential [[id number]]  (Abnormal) Collected: 01/26/25 1315    Specimen: Blood Updated: 01/26/25 1328      WBC 4.17 10*3/mm3      RBC 4.34 10*6/mm3      Hemoglobin 12.8 g/dL      Hematocrit 40.3 %      MCV 92.9 fL      MCH 29.5 pg      MCHC 31.8 g/dL      RDW 13.2 %      RDW-SD 45.1 fl      MPV 9.6 fL      Platelets 165 10*3/mm3      Neutrophil % 66.2 %      Lymphocyte % 21.8 %      Monocyte % 9.8 %      Eosinophil % 0.7 %      Basophil % 0.5 %      Immature Grans % 1.0 %      Neutrophils, Absolute 2.76 10*3/mm3      Lymphocytes, Absolute 0.91 10*3/mm3      Monocytes, Absolute 0.41 10*3/mm3      Eosinophils, Absolute 0.03 10*3/mm3      Basophils, Absolute 0.02 10*3/mm3      Immature Grans, Absolute 0.04 10*3/mm3      nRBC 0.0 /100 WBC             Condition on Discharge: Stable    Discharge Disposition  Home or Self Care    Discharge Medications     Discharge Medications        New Medications        Instructions Start Date   calcium carbonate (oyster shell) 500 MG tablet tablet   500 mg, Oral, 2 Times Daily      vitamin D3 125 MCG (5000 UT) capsule capsule   5,000 Units, Oral, Daily             Continue These Medications        Instructions Start Date   atorvastatin 20 MG tablet  Commonly known as: LIPITOR   20 mg, Oral, Nightly      dilTIAZem  MG 24 hr capsule  Commonly known as: CARDIZEM CD   120 mg, Oral, Daily      flecainide 100 MG tablet  Commonly known as: TAMBOCOR   100 mg, Oral, 2 Times Daily               Discharge Diet:   Diet Instructions       Diet: Regular/House Diet; Regular (IDDSI 7); Thin (IDDSI 0)      Discharge Diet: Regular/House Diet    Texture: Regular (IDDSI 7)    Fluid Consistency: Thin (IDDSI 0)            Discharge Care Plan / Instructions:    Activity at Discharge:   Activity Instructions       Activity as Tolerated              Follow-up Appointments  Future Appointments   Date Time Provider Department Center   9/22/2025  8:15 AM Tee Lentz MD [facility]     Additional Instructions for the Follow-ups that You Need to Schedule       Discharge Follow-up with PCP   As  directed       Currently Documented PCP:    Davey Xavier MD    PCP Phone Number:    [phone]     Follow Up Details: as scheduled                Test Results Pending at Discharge       JUSTIN Brito  01/28/25  08:10 CST    Time: Discharge 35 min    Part of this note may be an electronic transcription/translation of spoken language to printed text using the Dragon Dictation System.

## 2025-02-04 LAB
QT INTERVAL: 450 MS
QTC INTERVAL: 468 MS

## 2025-02-17 ENCOUNTER — TELEPHONE (OUTPATIENT)
Dept: MRI IMAGING | Facility: HOSPITAL | Age: 75
End: 2025-02-17
Payer: COMMERCIAL

## 2025-02-17 NOTE — TELEPHONE ENCOUNTER
A notification letter was sent to the patient explaining that a recent imaging exam showed an incidental finding, for which follow-up testing may be recommended.  Message for patient, ordered by PCP. Attempted to call their office, unable to leave message.

## (undated) DEVICE — SNAR POLYP CAPTIVATOR MICROHEX 13 240CM

## (undated) DEVICE — THE CHANNEL CLEANING BRUSH IS A NYLON FLEXI BRUSH ATTACHED TO A FLEXIBLE PLASTIC SHEATH DESIGNED TO SAFELY REMOVE DEBRIS FROM FLEXIBLE ENDOSCOPES.

## (undated) DEVICE — Device: Brand: DEFENDO AIR/WATER/SUCTION AND BIOPSY VALVE

## (undated) DEVICE — MSK O2 MD CONCENTR A/ LF 7FT 1P/U

## (undated) DEVICE — THE SINGLE USE ETRAP – POLYP TRAP IS USED FOR SUCTION RETRIEVAL OF ENDOSCOPICALLY REMOVED POLYPS.: Brand: ETRAP

## (undated) DEVICE — ENDOGATOR AUXILIARY WATER JET CONNECTOR: Brand: ENDOGATOR

## (undated) DEVICE — SENSR O2 OXIMAX FNGR A/ 18IN NONSTR

## (undated) DEVICE — TBG SMPL FLTR LINE NASL 02/C02 A/ BX/100